# Patient Record
Sex: MALE | Race: WHITE | NOT HISPANIC OR LATINO | Employment: UNEMPLOYED | URBAN - METROPOLITAN AREA
[De-identification: names, ages, dates, MRNs, and addresses within clinical notes are randomized per-mention and may not be internally consistent; named-entity substitution may affect disease eponyms.]

---

## 2020-12-26 NOTE — NUR
PATIENT RESTING COMFORTABLY IN HOSPITAL BED IN A SAFETY ROOM WITH A SITTER 
OUTSIDE DOOR WITHIN VIEW. SAFETY DINNER TRAY PROVIDED. NO ADDITIONAL NEEDS AT 
THIS TIME.

## 2020-12-26 NOTE — NUR
PT FONDLING HIS GENITALIA IN PLAIN VIEW OF DOORWAY. VERBALLY REDIRECTED AND 
REMINDED TO KEEP HIS PRIVATE AREAS PRIVATE. PT VERBALLY AGREES AND APOLOGIZES 
AT THIS TIME. DENIES ANY FURTHER NEEDS OR CONCERNS. SITTER CONTINUES IN DIRECT 
LINE OF SIGHT.

## 2020-12-26 NOTE — NUR
PATIENTS BELONGINGS IN ONE BAG. ALTHOUGH HE STATES HE IS "TIRED AND WANTS TO 
KILL HIMSELF ANYWAY HE CAN" HE DOES NOT HAVE ACCESS TO ANY WEAPONS INCLUDING A 
GUN. MEAL TRAY ORDERED. SITTER OUTSIDE ROOM WITHIN VIEW.

## 2020-12-26 NOTE — NUR
PATIENT BIB [facility] AFTER COMPLAINING OF 9/10 CP/ NITRO  ASPIRIN IN ROUTE. 
PAIN STILL 9/10. WHILE WAITING FOR ROOM STARTED STATING HE WANTED TO KILL 
HIMSELF. PATIENT IS ACTIVELY HALLUCINATING, HEARING VOICES TELLING HIM TO KILL 
HIMSELF. HE STATES "HE CAN'T WAIT TO GET OUT OF HERE SO HE CAN GO KILL 
HIMSELF." WHEN ASKED IF HE HAD A PLAN HE STATED HE WOULD JUMP IN FRONT OF 
TRAFFIC, SLIT HIS THROAT, SLIT HIS WRISTS, OR SHOOT HIMSELF. PATIENT IS IN A 
SECURE ROOM WITH A SITTER OUTSIDE DOOR WITHIN VIEW. WARM BLANKETS PROVIDED AND 
ALL BELONGINGS REMOVED AND LOCKED UP. PATIENT NORMALLY TAKES SEROQUEL AND 
PROZAC BUT HAS BEEN OFF THEM FOR ABOUT A MONTH.

## 2020-12-26 NOTE — NUR
PT RESTING IN BED, EYES CLOSED, RESPIRATIONS EVEN AND UNLABORED. SITTER 
CONTINUES IN DIRECT LINE OF SIGHT.

## 2020-12-26 NOTE — NUR
PT IN BED, LIGHTS DIMMED. PT DENIES ANY CURRENT NEEDS OR CONCERNS, SITTER 
CONTINUES IN DIRECT LINE OF SIGHT.

## 2020-12-27 NOTE — NUR
Patient resting in hospital bed with no complaints. Respirations even and 
unlabored. Room secured, belongings locked in cabinet. Sitter outside.

## 2020-12-27 NOTE — NUR
All items from tray returned and disposed of. Pt ate 100% of meal tray. He 
states the voices "are getting worse." No PRN meds currently ordered. Will 
speak with MD regarding possible PRN medications.

## 2020-12-27 NOTE — NUR
Pt ate 100% of breakfast and tray removed/cleaned up room. Pt resting with 
lights dimmed without further c/o voiced at this time.

## 2020-12-27 NOTE — NUR
PT RESTING, EYES CLOSED, RESPIRATIONS EVEN AND UNLABORED. NAD NOTED. SITTER IN 
DIRECT LINE OF SIGHT.

## 2020-12-27 NOTE — NUR
Report received and care assumed. Pt in room lying on left side and awakens as 
soon as I enter. He asked if anyone was outside to get him and reassurances 
were made they were not and that he is in a safe place. He states he wants to 
kill himself, denies need for food, and is cold. Warm blanket provided and room 
cleaned of safety tray and empty milk carton. No other items beside bed and 
linens in room with pt and PSA and RN posted outside of room to continue to 
ensure pt safety from himself and falls.

## 2020-12-27 NOTE — NUR
Pt resting in room with head covered by blanket at this time, NAD noted and no 
changes from initial assessment noted.

## 2020-12-27 NOTE — NUR
Pt awake and provided crackers and juice for a snack as he slept through lunch. 
VS reassessed and found to be within baseline from shift earlier.

## 2020-12-27 NOTE — NUR
Report received from ENIO Cedeno. This RN to assume care. Patient sleeping in 
hospital bed. Respirations even and unlabored. Room secured, belongings locked 
in cabinet. Sitter outside.

## 2020-12-27 NOTE — NUR
PT AWAKE IN BED, BUT SILENT. DENIES ANY NEEDS OR CONCERNS AT THIS TIME. SITTER 
CONTINUES IN DIRECT LINE OF SIGHT.

## 2020-12-27 NOTE — NUR
Pt provided 2 apple juice cups at this time and updated to plan of care for PRN 
Ativan should he feel like he is going to escalate. Pt states understanding and 
agreement made to notify RN prior to escalation.

## 2020-12-27 NOTE — NUR
Pt up to bathroom with PSA yelling that he wants to kill himself. Walked back 
to room without further issue or escalation and provided breakfast tray.

## 2020-12-28 NOTE — NUR
report from james pt in nad asked for water water given pt in nad resting 
in bed with sitter at doorway for observation

## 2020-12-28 NOTE — NUR
Break RN: Patient resting in hospital bed with no complaints. Respirations even 
and unlabored. Room secured, belongings locked in cabinet. Sitter outside.

## 2020-12-28 NOTE — NUR
PT RESTING IN BED WITH SITTER AT DOOR. PT IN SI SERCURED ROOM. PT HAS EQUAL AND 
UNLABORED BREATHING.

## 2020-12-28 NOTE — NUR
PT LEFT ROOM AND TOOK SHORT WALK IN HALLS.  PT EASILY CORALLED BACK TO ROOM.  
PT GIVEN DINNER TRAY.  PT ATE MEAL.  IS CURRENTLY RESTING IN BED. WILL CONTINUE 
TO MONITOR.

## 2020-12-28 NOTE — NUR
PT RESTING CALMLY IN BED AT THIS TIME.  PT DID EAT MEAL TRAY.  NO STATED NEEDS 
FROM PT AT THIS TIME.  WILL CONTINUE TO MONITOR.

## 2020-12-28 NOTE — NUR
BREAK RN: PT SLEEPING ON HOSPITAL BED W/ GARAGE DOORS DOWN AND SITTER OUTSIDE 
ROOM FOR SAFETY. RESP EVEN AND UNLABORED, ABARHAM.

## 2020-12-28 NOTE — NUR
PT RESTING IN BED.  PT GIVEN CRACKERS AND SODA PER HIS REQUEST.  NO OTHER NEEDS 
AT THIS TIME.  SITTER AT DOOR FOR OBS.

## 2020-12-28 NOTE — NUR
PT LEFT ROOM AND WALKED AROUND UNIT IN A DETERMINED MANNER.  PT NOT LISTENING 
TO VERBAL DIRECTION.  SECURITY WAS CALLED TO ASSIST PT BACK TO ROOM.  PT 
BREATHING HEAVY AND FAST, HAS PULLED COVERS OVER FACE. WHEN ASKED PT WHY HE 
LEFT ROOM, PT STATED THERE ARE PEOPLE AFTER HIM.  ERP MADE AWARE OF PT CURRENT 
CONDITION.  ORDERS PLACED.  PT COOPERATIVE WITH QUESTIONS AND VITALS.  PT TOOK 
ORDERED MEDS.  SITTER AT DOOR.  WILL CONTINUE TO MONITOR.

## 2020-12-29 NOTE — NUR
[facility] THE 
MORNING. 12/30

## 2020-12-29 NOTE — NUR
PT CONTINUES TO SLEEP WITH NO COMPLAINTS, CHEST RISE AND FALL OBSERVED, SITTER 
AT BEDSIDE, ROOM SECURE

## 2020-12-29 NOTE — NUR
pt resting on hospital bed, nad, appears comfortable, denies additional 
questions or needs at this time, even and unlabored respirations, si 
precautions in place, sitter in line of sight, wctm.

## 2020-12-29 NOTE — NUR
PT SITTING ON HOSPITAL BED WITH EYES OPEN, ALERT TO ENVIRONMENT. PT'S BREAKFAST 
DELIVERED. PT STATES "MY HALLUCINATIONS ARE GETTING WORSE".

## 2020-12-29 NOTE — NUR
PT RESTING ON HOSPITAL BED, NAD, NO CHANGE IN CONDITION AT THIS TIME, EYES 
CLOSED, EVEN AND UNLABORED RESPIRATIONS. SITTER IN LINE OF SIGHT, SI 
PRECAUTIONS IN PLACE, WCTM. L2K

## 2020-12-29 NOTE — NUR
PT PHYSICAL AND SUICDIDE REASSESSMENT. VSS. PT STATES HE STILL HAS SUICIDAL 
THOUGHTS AND HALLUCINATIONS. HIS PLAN WOULD BE TO CUT HIS THROAT. PT MEDICATED 
PER MAR.

## 2020-12-29 NOTE — NUR
PT'S DINNER TRAY DELIVERED. PT RESTING ON [facility], EYES CLOSED. WOKE TO 
RN IN ROOM. NAD. ALL NEEDS MET AT THIS TIME.

## 2020-12-29 NOTE — NUR
BREAK RN: PT. RESTING ON BED WITH EYES CLOSED.  NO DISTRESS VISIBLE.  
RESPIRATIONS EVEN, NON-LABORED.  PT. IN SECURED ROOM WITH SITTER IN [place]ILA.

## 2020-12-29 NOTE — NUR
BREAK RN: PT CALMLY LAYING ON BED WITH EYES CLOSED, NAD WITH EQUAL CHEST 
RISE/FALL, NO NEEDS AT THIS TIME, PT REMAINS IN SAFE ENVIRONMENT, SITTER IN 
VIEW.

## 2020-12-30 NOTE — NUR
BREAK RN: PT SLEEPING ON GURNEY W/ GARAGE DOORS DOWN AND SITTER OUTSIDE ROOM 
FOR SAFETY. RESP EVEN AND UNLABORED, ABRAHAM.

## 2020-12-30 NOTE — NUR
THROUGHPUT NURSE: CALLED RASHAAD AT [facility], THE CHARGE NURSE, TO INQUIRE ABOUT 
ACCEPTING PT AND RASHAAD SAID HE WOULD CALL BACK

## 2020-12-30 NOTE — NUR
THROUGHPUT: PER RASHAAD ([facility]) "PT IS 2ND OR 3RD IN LINE FOR A BED SO IT'LL BE 
A FEW DAYS BEFORE WE CAN TAKE HIM", PSYCH APRN ([place]) AWARE & WILL FOLLOW-UP 
TOMORROW ON POC WITH SW TO LOOK INTO POSSIBLE BHU PLACEMENT SOONER.

## 2020-12-30 NOTE — NUR
pt medicated per emar, provided juice per request. pt denies any other needs, 
in line of sight of sitter

## 2020-12-31 NOTE — NUR
attempted to deliver meal tray, pt declines at this time. pt states that he 
wants to sleep more but that he will eat it later.  no resp distress



pt reports that he is still suicidal.  reports that he has a plan of "cutting 
his throat"  because "it's fast"



pt admits to visual and auditory hallucinations.  resting in position of 
comfort on hospital bed.  



room secure.  sitter present for safety

## 2020-12-31 NOTE — NUR
ALL PT BELONGINGS TRANSFERRED TO [facility] STAFF.  PT AMBULATORY W/ STEADY GAIT.  
PT DISCHARGED TO [facility] STAFF CARE.

## 2020-12-31 NOTE — NUR
PT GIVEN NEW LINENS, AMBULATED STEADY TO RESTROOM. REFUSES SHOWER AT THIS TIME 
AND STATES HE WILL TAKE ONE IN THE MORNING

## 2020-12-31 NOTE — NUR
assumed care of pt.  report from Katlin STEEN



pt here on legal hold for SI and hx of schizophrenia.  per report, pt has been 
having visual and auditory hallucinations and has a plan foor ending his life.  




pt is currently sleeping in position of comfort in no apparent distress.  room 
secure.  sitter at beside



pt is currenty awaiting placement at [facility]

## 2021-02-10 ENCOUNTER — APPOINTMENT (OUTPATIENT)
Dept: RADIOLOGY | Facility: MEDICAL CENTER | Age: 61
End: 2021-02-10
Attending: EMERGENCY MEDICINE
Payer: MEDICAID

## 2021-02-10 ENCOUNTER — HOSPITAL ENCOUNTER (EMERGENCY)
Dept: HOSPITAL 8 - ED | Age: 61
Discharge: HOME | End: 2021-02-10
Payer: SELF-PAY

## 2021-02-10 ENCOUNTER — HOSPITAL ENCOUNTER (OUTPATIENT)
Facility: MEDICAL CENTER | Age: 61
End: 2021-02-10
Attending: EMERGENCY MEDICINE | Admitting: INTERNAL MEDICINE
Payer: MEDICAID

## 2021-02-10 ENCOUNTER — PATIENT OUTREACH (OUTPATIENT)
Dept: HEALTH INFORMATION MANAGEMENT | Facility: OTHER | Age: 61
End: 2021-02-10

## 2021-02-10 ENCOUNTER — APPOINTMENT (OUTPATIENT)
Dept: CARDIOLOGY | Facility: MEDICAL CENTER | Age: 61
End: 2021-02-10
Attending: INTERNAL MEDICINE
Payer: MEDICAID

## 2021-02-10 ENCOUNTER — PHARMACY VISIT (OUTPATIENT)
Dept: PHARMACY | Facility: MEDICAL CENTER | Age: 61
End: 2021-02-10
Payer: COMMERCIAL

## 2021-02-10 VITALS — WEIGHT: 174.17 LBS | HEIGHT: 68 IN | BODY MASS INDEX: 26.4 KG/M2

## 2021-02-10 VITALS
RESPIRATION RATE: 17 BRPM | BODY MASS INDEX: 26.4 KG/M2 | TEMPERATURE: 96.9 F | DIASTOLIC BLOOD PRESSURE: 74 MMHG | SYSTOLIC BLOOD PRESSURE: 135 MMHG | OXYGEN SATURATION: 96 % | WEIGHT: 174.16 LBS | HEART RATE: 91 BPM | HEIGHT: 68 IN

## 2021-02-10 VITALS — DIASTOLIC BLOOD PRESSURE: 41 MMHG | SYSTOLIC BLOOD PRESSURE: 96 MMHG

## 2021-02-10 DIAGNOSIS — R07.9 ACUTE CHEST PAIN: ICD-10-CM

## 2021-02-10 DIAGNOSIS — I20.0 UNSTABLE ANGINA (HCC): ICD-10-CM

## 2021-02-10 DIAGNOSIS — R07.89: ICD-10-CM

## 2021-02-10 DIAGNOSIS — R07.2: Primary | ICD-10-CM

## 2021-02-10 DIAGNOSIS — E11.9: ICD-10-CM

## 2021-02-10 DIAGNOSIS — F41.9: ICD-10-CM

## 2021-02-10 DIAGNOSIS — Z79.01: ICD-10-CM

## 2021-02-10 DIAGNOSIS — I26.99: ICD-10-CM

## 2021-02-10 DIAGNOSIS — R06.02: ICD-10-CM

## 2021-02-10 DIAGNOSIS — R79.89 ELEVATED TROPONIN: ICD-10-CM

## 2021-02-10 DIAGNOSIS — I26.93 SINGLE SUBSEGMENTAL PULMONARY EMBOLISM WITHOUT ACUTE COR PULMONALE (HCC): ICD-10-CM

## 2021-02-10 PROBLEM — D72.829 LEUKOCYTOSIS: Status: ACTIVE | Noted: 2021-02-10

## 2021-02-10 PROBLEM — F19.10 POLYSUBSTANCE ABUSE (HCC): Status: ACTIVE | Noted: 2021-02-10

## 2021-02-10 PROBLEM — F17.200 TOBACCO DEPENDENCE: Status: ACTIVE | Noted: 2021-02-10

## 2021-02-10 LAB
ALBUMIN SERPL BCP-MCNC: 4.2 G/DL (ref 3.2–4.9)
ALBUMIN SERPL-MCNC: 3.8 G/DL (ref 3.4–5)
ALBUMIN/GLOB SERPL: 1.4 G/DL
ALP SERPL-CCNC: 126 U/L (ref 30–99)
ALT SERPL-CCNC: 11 U/L (ref 2–50)
AMPHET UR QL SCN: POSITIVE
ANION GAP SERPL CALC-SCNC: 17 MMOL/L (ref 7–16)
ANION GAP SERPL CALC-SCNC: 9 MMOL/L (ref 5–15)
AST SERPL-CCNC: 30 U/L (ref 12–45)
BARBITURATES UR QL SCN: NEGATIVE
BASOPHILS # BLD AUTO: 0.1 X10^3/UL (ref 0–0.1)
BASOPHILS # BLD AUTO: 0.3 % (ref 0–1.8)
BASOPHILS # BLD: 0.05 K/UL (ref 0–0.12)
BASOPHILS NFR BLD AUTO: 1 % (ref 0–1)
BENZODIAZ UR QL SCN: NEGATIVE
BILIRUB SERPL-MCNC: 0.8 MG/DL (ref 0.1–1.5)
BUN SERPL-MCNC: 22 MG/DL (ref 8–22)
BZE UR QL SCN: NEGATIVE
CALCIUM SERPL-MCNC: 9.3 MG/DL (ref 8.5–10.1)
CALCIUM SERPL-MCNC: 9.5 MG/DL (ref 8.5–10.5)
CANNABINOIDS UR QL SCN: NEGATIVE
CHLORIDE SERPL-SCNC: 101 MMOL/L (ref 96–112)
CHLORIDE SERPL-SCNC: 109 MMOL/L (ref 98–107)
CK SERPL-CCNC: 610 U/L (ref 0–154)
CO2 SERPL-SCNC: 23 MMOL/L (ref 20–33)
CREAT SERPL-MCNC: 1.01 MG/DL (ref 0.5–1.4)
CREAT SERPL-MCNC: 1.25 MG/DL (ref 0.7–1.3)
D DIMER PPP IA.FEU-MCNC: 0.71 UG/ML (FEU) (ref 0–0.5)
EKG IMPRESSION: NORMAL
EKG IMPRESSION: NORMAL
EOSINOPHIL # BLD AUTO: 0 K/UL (ref 0–0.51)
EOSINOPHIL # BLD AUTO: 0 X10^3/UL (ref 0–0.4)
EOSINOPHIL NFR BLD AUTO: 0 % (ref 1–7)
EOSINOPHIL NFR BLD: 0 % (ref 0–6.9)
ERYTHROCYTE [DISTWIDTH] IN BLOOD BY AUTOMATED COUNT: 14.1 % (ref 9.4–14.8)
ERYTHROCYTE [DISTWIDTH] IN BLOOD BY AUTOMATED COUNT: 47.6 FL (ref 35.9–50)
GLOBULIN SER CALC-MCNC: 3 G/DL (ref 1.9–3.5)
GLUCOSE SERPL-MCNC: 111 MG/DL (ref 65–99)
HCT VFR BLD AUTO: 43.4 % (ref 42–52)
HGB BLD-MCNC: 14.3 G/DL (ref 14–18)
IMM GRANULOCYTES # BLD AUTO: 0.1 K/UL (ref 0–0.11)
IMM GRANULOCYTES NFR BLD AUTO: 0.6 % (ref 0–0.9)
INR PPP: 1.32 (ref 0.93–1.1)
LIPASE SERPL-CCNC: 11 U/L (ref 11–82)
LV EJECT FRACT  99904: 65
LV EJECT FRACT MOD 2C 99903: 59.85
LV EJECT FRACT MOD 4C 99902: 65.59
LV EJECT FRACT MOD BP 99901: 63.36
LYMPHOCYTES # BLD AUTO: 1.4 X10^3/UL (ref 1–3.4)
LYMPHOCYTES # BLD AUTO: 1.77 K/UL (ref 1–4.8)
LYMPHOCYTES NFR BLD AUTO: 10 % (ref 22–44)
LYMPHOCYTES NFR BLD: 10.4 % (ref 22–41)
MCH RBC QN AUTO: 31.2 PG (ref 27–33)
MCH RBC QN AUTO: 31.3 PG (ref 27.5–34.5)
MCHC RBC AUTO-ENTMCNC: 32.9 G/DL (ref 33.7–35.3)
MCHC RBC AUTO-ENTMCNC: 34.1 G/DL (ref 33.2–36.2)
MCV RBC AUTO: 94.8 FL (ref 81.4–97.8)
MD: NO
METHADONE UR QL SCN: NEGATIVE
MONOCYTES # BLD AUTO: 0.9 X10^3/UL (ref 0.2–0.8)
MONOCYTES # BLD AUTO: 1.11 K/UL (ref 0–0.85)
MONOCYTES NFR BLD AUTO: 6.5 % (ref 0–13.4)
MONOCYTES NFR BLD AUTO: 7 % (ref 2–9)
NEUTROPHILS # BLD AUTO: 11.1 X10^3/UL (ref 1.8–6.8)
NEUTROPHILS # BLD AUTO: 13.97 K/UL (ref 1.82–7.42)
NEUTROPHILS NFR BLD AUTO: 82 % (ref 42–75)
NEUTROPHILS NFR BLD: 82.2 % (ref 44–72)
NRBC # BLD AUTO: 0 K/UL
NRBC BLD-RTO: 0 /100 WBC
OPIATES UR QL SCN: POSITIVE
OXYCODONE UR QL SCN: NEGATIVE
PCP UR QL SCN: NEGATIVE
PLATELET # BLD AUTO: 325 K/UL (ref 164–446)
PLATELET # BLD AUTO: 325 X10^3/UL (ref 130–400)
PMV BLD AUTO: 7.8 FL (ref 7.4–10.4)
PMV BLD AUTO: 9.6 FL (ref 9–12.9)
POTASSIUM SERPL-SCNC: 4.3 MMOL/L (ref 3.6–5.5)
PROPOXYPH UR QL SCN: NEGATIVE
PROT SERPL-MCNC: 7.2 G/DL (ref 6–8.2)
PROTHROMBIN TIME: 14 SECONDS (ref 9.6–11.5)
RBC # BLD AUTO: 4.56 X10^6/UL (ref 4.38–5.82)
RBC # BLD AUTO: 4.58 M/UL (ref 4.7–6.1)
SARS-COV-2 RNA RESP QL NAA+PROBE: NOTDETECTED
SODIUM SERPL-SCNC: 141 MMOL/L (ref 135–145)
SPECIMEN SOURCE: NORMAL
TROPONIN I SERPL-MCNC: 0.03 NG/ML (ref 0–0.04)
TROPONIN T SERPL-MCNC: 23 NG/L (ref 6–19)
TROPONIN T SERPL-MCNC: 23 NG/L (ref 6–19)
TROPONIN T SERPL-MCNC: 27 NG/L (ref 6–19)
TSH SERPL DL<=0.005 MIU/L-ACNC: 0.94 UIU/ML (ref 0.38–5.33)
WBC # BLD AUTO: 17 K/UL (ref 4.8–10.8)

## 2021-02-10 PROCEDURE — 71045 X-RAY EXAM CHEST 1 VIEW: CPT

## 2021-02-10 PROCEDURE — 700117 HCHG RX CONTRAST REV CODE 255: Performed by: EMERGENCY MEDICINE

## 2021-02-10 PROCEDURE — 84484 ASSAY OF TROPONIN QUANT: CPT | Mod: 91

## 2021-02-10 PROCEDURE — G0378 HOSPITAL OBSERVATION PER HR: HCPCS

## 2021-02-10 PROCEDURE — 82040 ASSAY OF SERUM ALBUMIN: CPT

## 2021-02-10 PROCEDURE — 80048 BASIC METABOLIC PNL TOTAL CA: CPT

## 2021-02-10 PROCEDURE — A9270 NON-COVERED ITEM OR SERVICE: HCPCS | Performed by: INTERNAL MEDICINE

## 2021-02-10 PROCEDURE — 93005 ELECTROCARDIOGRAM TRACING: CPT

## 2021-02-10 PROCEDURE — 700111 HCHG RX REV CODE 636 W/ 250 OVERRIDE (IP): Performed by: EMERGENCY MEDICINE

## 2021-02-10 PROCEDURE — U0005 INFEC AGEN DETEC AMPLI PROBE: HCPCS

## 2021-02-10 PROCEDURE — 80053 COMPREHEN METABOLIC PANEL: CPT

## 2021-02-10 PROCEDURE — 700111 HCHG RX REV CODE 636 W/ 250 OVERRIDE (IP): Performed by: INTERNAL MEDICINE

## 2021-02-10 PROCEDURE — 85379 FIBRIN DEGRADATION QUANT: CPT

## 2021-02-10 PROCEDURE — 93306 TTE W/DOPPLER COMPLETE: CPT

## 2021-02-10 PROCEDURE — 93005 ELECTROCARDIOGRAM TRACING: CPT | Performed by: INTERNAL MEDICINE

## 2021-02-10 PROCEDURE — 85025 COMPLETE CBC W/AUTO DIFF WBC: CPT

## 2021-02-10 PROCEDURE — U0003 INFECTIOUS AGENT DETECTION BY NUCLEIC ACID (DNA OR RNA); SEVERE ACUTE RESPIRATORY SYNDROME CORONAVIRUS 2 (SARS-COV-2) (CORONAVIRUS DISEASE [COVID-19]), AMPLIFIED PROBE TECHNIQUE, MAKING USE OF HIGH THROUGHPUT TECHNOLOGIES AS DESCRIBED BY CMS-2020-01-R: HCPCS

## 2021-02-10 PROCEDURE — 96374 THER/PROPH/DIAG INJ IV PUSH: CPT | Mod: XU

## 2021-02-10 PROCEDURE — 99218 PR INITIAL OBSERVATION CARE,LEVL I: CPT | Performed by: INTERNAL MEDICINE

## 2021-02-10 PROCEDURE — A9270 NON-COVERED ITEM OR SERVICE: HCPCS | Performed by: EMERGENCY MEDICINE

## 2021-02-10 PROCEDURE — 83735 ASSAY OF MAGNESIUM: CPT

## 2021-02-10 PROCEDURE — 93306 TTE W/DOPPLER COMPLETE: CPT | Mod: 26 | Performed by: INTERNAL MEDICINE

## 2021-02-10 PROCEDURE — 82550 ASSAY OF CK (CPK): CPT

## 2021-02-10 PROCEDURE — 700102 HCHG RX REV CODE 250 W/ 637 OVERRIDE(OP): Performed by: INTERNAL MEDICINE

## 2021-02-10 PROCEDURE — 96376 TX/PRO/DX INJ SAME DRUG ADON: CPT | Mod: XU

## 2021-02-10 PROCEDURE — 83690 ASSAY OF LIPASE: CPT

## 2021-02-10 PROCEDURE — 99285 EMERGENCY DEPT VISIT HI MDM: CPT

## 2021-02-10 PROCEDURE — 93005 ELECTROCARDIOGRAM TRACING: CPT | Performed by: EMERGENCY MEDICINE

## 2021-02-10 PROCEDURE — 85610 PROTHROMBIN TIME: CPT

## 2021-02-10 PROCEDURE — 84484 ASSAY OF TROPONIN QUANT: CPT

## 2021-02-10 PROCEDURE — 71275 CT ANGIOGRAPHY CHEST: CPT

## 2021-02-10 PROCEDURE — 80307 DRUG TEST PRSMV CHEM ANLYZR: CPT

## 2021-02-10 PROCEDURE — RXMED WILLOW AMBULATORY MEDICATION CHARGE: Performed by: HOSPITALIST

## 2021-02-10 PROCEDURE — 36415 COLL VENOUS BLD VENIPUNCTURE: CPT

## 2021-02-10 PROCEDURE — 96372 THER/PROPH/DIAG INJ SC/IM: CPT | Mod: XU

## 2021-02-10 PROCEDURE — 700102 HCHG RX REV CODE 250 W/ 637 OVERRIDE(OP): Performed by: EMERGENCY MEDICINE

## 2021-02-10 PROCEDURE — 84443 ASSAY THYROID STIM HORMONE: CPT

## 2021-02-10 RX ORDER — ASPIRIN 81 MG/1
324 TABLET, CHEWABLE ORAL ONCE
Status: COMPLETED | OUTPATIENT
Start: 2021-02-10 | End: 2021-02-10

## 2021-02-10 RX ORDER — ATORVASTATIN CALCIUM 80 MG/1
80 TABLET, FILM COATED ORAL EVERY EVENING
Qty: 30 TABLET | Refills: 0 | Status: SHIPPED | OUTPATIENT
Start: 2021-02-10

## 2021-02-10 RX ORDER — BISACODYL 10 MG
10 SUPPOSITORY, RECTAL RECTAL
Status: DISCONTINUED | OUTPATIENT
Start: 2021-02-10 | End: 2021-02-10 | Stop reason: HOSPADM

## 2021-02-10 RX ORDER — ASPIRIN 300 MG/1
300 SUPPOSITORY RECTAL ONCE
Status: COMPLETED | OUTPATIENT
Start: 2021-02-10 | End: 2021-02-10

## 2021-02-10 RX ORDER — POLYETHYLENE GLYCOL 3350 17 G/17G
1 POWDER, FOR SOLUTION ORAL
Status: DISCONTINUED | OUTPATIENT
Start: 2021-02-10 | End: 2021-02-10 | Stop reason: HOSPADM

## 2021-02-10 RX ORDER — ASPIRIN 81 MG/1
81 TABLET ORAL DAILY
Qty: 30 TABLET | Refills: 0 | Status: SHIPPED | OUTPATIENT
Start: 2021-02-11

## 2021-02-10 RX ORDER — ACETAMINOPHEN 325 MG/1
650 TABLET ORAL EVERY 6 HOURS PRN
Status: DISCONTINUED | OUTPATIENT
Start: 2021-02-10 | End: 2021-02-10 | Stop reason: HOSPADM

## 2021-02-10 RX ORDER — LABETALOL HYDROCHLORIDE 5 MG/ML
10 INJECTION, SOLUTION INTRAVENOUS EVERY 4 HOURS PRN
Status: DISCONTINUED | OUTPATIENT
Start: 2021-02-10 | End: 2021-02-10 | Stop reason: HOSPADM

## 2021-02-10 RX ORDER — NITROGLYCERIN 0.4 MG/1
0.4 TABLET SUBLINGUAL
Status: COMPLETED | OUTPATIENT
Start: 2021-02-10 | End: 2021-02-10

## 2021-02-10 RX ORDER — ATORVASTATIN CALCIUM 80 MG/1
80 TABLET, FILM COATED ORAL EVERY EVENING
Status: DISCONTINUED | OUTPATIENT
Start: 2021-02-10 | End: 2021-02-10 | Stop reason: HOSPADM

## 2021-02-10 RX ORDER — MORPHINE SULFATE 4 MG/ML
4 INJECTION, SOLUTION INTRAMUSCULAR; INTRAVENOUS
Status: DISCONTINUED | OUTPATIENT
Start: 2021-02-10 | End: 2021-02-10 | Stop reason: HOSPADM

## 2021-02-10 RX ORDER — MORPHINE SULFATE 4 MG/ML
4 INJECTION, SOLUTION INTRAMUSCULAR; INTRAVENOUS ONCE
Status: COMPLETED | OUTPATIENT
Start: 2021-02-10 | End: 2021-02-10

## 2021-02-10 RX ORDER — NICOTINE 21 MG/24HR
21 PATCH, TRANSDERMAL 24 HOURS TRANSDERMAL
Status: DISCONTINUED | OUTPATIENT
Start: 2021-02-10 | End: 2021-02-10 | Stop reason: HOSPADM

## 2021-02-10 RX ORDER — ENALAPRIL MALEATE 2.5 MG/1
2.5 TABLET ORAL TWICE DAILY
Status: DISCONTINUED | OUTPATIENT
Start: 2021-02-10 | End: 2021-02-10 | Stop reason: HOSPADM

## 2021-02-10 RX ORDER — AMOXICILLIN 250 MG
2 CAPSULE ORAL 2 TIMES DAILY
Status: DISCONTINUED | OUTPATIENT
Start: 2021-02-10 | End: 2021-02-10 | Stop reason: HOSPADM

## 2021-02-10 RX ADMIN — ENOXAPARIN SODIUM 80 MG: 80 INJECTION SUBCUTANEOUS at 06:34

## 2021-02-10 RX ADMIN — MORPHINE SULFATE 4 MG: 4 INJECTION INTRAVENOUS at 04:24

## 2021-02-10 RX ADMIN — MORPHINE SULFATE 4 MG: 4 INJECTION INTRAVENOUS at 05:36

## 2021-02-10 RX ADMIN — IOHEXOL 40 ML: 350 INJECTION, SOLUTION INTRAVENOUS at 05:30

## 2021-02-10 RX ADMIN — NITROGLYCERIN 0.4 MG: 0.4 TABLET, ORALLY DISINTEGRATING SUBLINGUAL at 04:54

## 2021-02-10 RX ADMIN — ASPIRIN 324 MG: 81 TABLET, CHEWABLE ORAL at 03:56

## 2021-02-10 RX ADMIN — ENALAPRIL MALEATE 2.5 MG: 2.5 TABLET ORAL at 09:38

## 2021-02-10 RX ADMIN — METOPROLOL TARTRATE 25 MG: 25 TABLET, FILM COATED ORAL at 06:34

## 2021-02-10 RX ADMIN — NICOTINE TRANSDERMAL SYSTEM 21 MG: 21 PATCH, EXTENDED RELEASE TRANSDERMAL at 06:35

## 2021-02-10 RX ADMIN — NITROGLYCERIN 0.4 MG: 0.4 TABLET, ORALLY DISINTEGRATING SUBLINGUAL at 04:23

## 2021-02-10 RX ADMIN — NITROGLYCERIN 0.4 MG: 0.4 TABLET, ORALLY DISINTEGRATING SUBLINGUAL at 04:34

## 2021-02-10 ASSESSMENT — ENCOUNTER SYMPTOMS
PALPITATIONS: 0
STRIDOR: 0
FEVER: 0
INSOMNIA: 0
BLURRED VISION: 0
NAUSEA: 0
MYALGIAS: 0
SORE THROAT: 0
NECK PAIN: 0
HEMOPTYSIS: 0
COUGH: 0
WEIGHT LOSS: 0
HEADACHES: 0
DOUBLE VISION: 0
BRUISES/BLEEDS EASILY: 0
VOMITING: 0
DIZZINESS: 0
DEPRESSION: 0

## 2021-02-10 ASSESSMENT — LIFESTYLE VARIABLES
TOTAL SCORE: 0
HAVE PEOPLE ANNOYED YOU BY CRITICIZING YOUR DRINKING: NO
EVER FELT BAD OR GUILTY ABOUT YOUR DRINKING: NO
EVER HAD A DRINK FIRST THING IN THE MORNING TO STEADY YOUR NERVES TO GET RID OF A HANGOVER: NO
ALCOHOL_USE: NO
HOW MANY TIMES IN THE PAST YEAR HAVE YOU HAD 5 OR MORE DRINKS IN A DAY: 0
CONSUMPTION TOTAL: NEGATIVE
ON A TYPICAL DAY WHEN YOU DRINK ALCOHOL HOW MANY DRINKS DO YOU HAVE: 0
HAVE YOU EVER FELT YOU SHOULD CUT DOWN ON YOUR DRINKING: NO
TOTAL SCORE: 0
AVERAGE NUMBER OF DAYS PER WEEK YOU HAVE A DRINK CONTAINING ALCOHOL: 0
TOTAL SCORE: 0

## 2021-02-10 ASSESSMENT — PATIENT HEALTH QUESTIONNAIRE - PHQ9
1. LITTLE INTEREST OR PLEASURE IN DOING THINGS: NOT AT ALL
SUM OF ALL RESPONSES TO PHQ9 QUESTIONS 1 AND 2: 0
2. FEELING DOWN, DEPRESSED, IRRITABLE, OR HOPELESS: NOT AT ALL

## 2021-02-10 ASSESSMENT — PAIN DESCRIPTION - DESCRIPTORS: DESCRIPTORS: CRAMPING

## 2021-02-10 ASSESSMENT — FIBROSIS 4 INDEX: FIB4 SCORE: 1.73

## 2021-02-10 NOTE — CARE PLAN
Problem: Knowledge Deficit  Goal: Knowledge of disease process/condition, treatment plan, diagnostic tests, and medications will improve  Note: ECHO labs and lovenox ordered     Problem: Respiratory:  Goal: Respiratory status will improve  Note: Monitor any breathing discomfort

## 2021-02-10 NOTE — ASSESSMENT & PLAN NOTE
Complicated by coronary artery disease, tobacco and polysubstance abuse  ACS care set deployed, follow-up troponin, TSH, lipid profile, CTA chest

## 2021-02-10 NOTE — DISCHARGE SUMMARY
Discharge Summary    CHIEF COMPLAINT ON ADMISSION  Chief Complaint   Patient presents with   • Chest Pain       Reason for Admission  EMS     Admission Date  2/10/2021    CODE STATUS  Full Code    HPI & HOSPITAL COURSE  This is a 60 y.o. male with past medical history of CAD, tobacco abuse, substance abuse here with chest pain.  He was found to have nonocclusive PE in the lingular subsegmental branch.  He has been started on anticoagulation and does not requiring any supplemental oxygen.  His chest pain has resolved.  His echocardiogram was within normal limits.      Therefore, he is discharged in good and stable condition to home with close outpatient follow-up.      Discharge Date  2/10/2021    FOLLOW UP ITEMS POST DISCHARGE  Follow-up with PCP for further refills of Xarelto    DISCHARGE DIAGNOSES  Active Problems:    Pulmonary embolism (HCC) POA: Unknown    Pain in the chest POA: Unknown    Leukocytosis POA: Unknown    Polysubstance abuse (HCC) POA: Unknown    Tobacco dependence POA: Unknown  Resolved Problems:    * No resolved hospital problems. *      FOLLOW UP  No future appointments.  [address]  [address]  [facility] [address]  [phone]    Please call or walk in to establish with a Primary Care Physician and schedule a hospital follow up. Thank you       MEDICATIONS ON DISCHARGE     Medication List      START taking these medications      Instructions   aspirin 81 MG EC tablet  Start taking on: February 11, 2021   Take 1 tablet by mouth every day.  Dose: 81 mg     atorvastatin 80 MG tablet  Commonly known as: LIPITOR   Take 1 tablet by mouth every evening.  Dose: 80 mg     metoprolol tartrate 25 MG Tabs  Commonly known as: LOPRESSOR   Take 1 tablet by mouth 2 Times a Day.  Dose: 25 mg     * rivaroxaban 15 MG Tabs tablet  Commonly known as: XARELTO   Take 1 tablet by mouth 2 times a day, with meals.  Dose: 15 mg     * rivaroxaban 20 MG Tabs tablet  Start taking on:  March 3, 2021  Commonly known as: XARELTO   Take 1 tablet by mouth with dinner.  Dose: 20 mg         * This list has 2 medication(s) that are the same as other medications prescribed for you. Read the directions carefully, and ask your doctor or other care provider to review them with you.                Allergies  Allergies   Allergen Reactions   • Fish Allergy Hives       DIET  Orders Placed This Encounter   Procedures   • Diet Order Diet: Regular     Standing Status:   Standing     Number of Occurrences:   1     Order Specific Question:   Diet:     Answer:   Regular [1]       ACTIVITY  As tolerated.  Weight bearing as tolerated    CONSULTATIONS  None    PROCEDURES  None    LABORATORY  Lab Results   Component Value Date    SODIUM 141 02/10/2021    POTASSIUM 4.3 02/10/2021    CHLORIDE 101 02/10/2021    CO2 23 02/10/2021    GLUCOSE 111 (H) 02/10/2021    BUN 22 02/10/2021    CREATININE 1.01 02/10/2021        Lab Results   Component Value Date    WBC 17.0 (H) 02/10/2021    HEMOGLOBIN 14.3 02/10/2021    HEMATOCRIT 43.4 02/10/2021    PLATELETCT 325 02/10/2021        Total time of the discharge process exceeds 33 minutes.

## 2021-02-10 NOTE — ED NOTES
Med rec complete per pt at bedside.  Allergies reviewed and updated  Pt states no ABX in the last 14 days.

## 2021-02-10 NOTE — ED NOTES
Pt aox4, skin pink warm and dry, airway patent, rr even and unlabored, nad noted. No new complaints. Pt vss. Pt transports to floor on cardiac monitor in stable condition with CARMENCITA Teixeira.

## 2021-02-10 NOTE — PROGRESS NOTES
Received from ED, aaox4, up setady , denies any chest apin and discomfort, POC discussed, NPO until cleared by MD, labs and ECHO ordered, needs attended.

## 2021-02-10 NOTE — H&P
"Hospital Medicine History & Physical Note    Date of Service  2/10/2021    Primary Care Physician  No primary care provider on file.    Consultants    Code Status  Full Code    Chief Complaint  Chief Complaint   Patient presents with   • Chest Pain       History of Presenting Illness  60 y.o. male who presented 2/10/2021 brought in by EMS for acute, constant mid sternal chest pain onset 1.5 hours ago. Patient describes having \"merle-horse\"-like pain to his chest that started at 2:30 AM. He reports having a history of heart attacks, but his current pain does not feel as severe. Patient states he has taken nitro in the past but he currently is out. Patient has a history of smoking tobacco, marijuana, and methamphetamine. He has mild nausea, but denies any associated leg swelling, shortness of breath, vomiting, or hemoptysis. He denies any history of blood clots but was ordered some kind of blood thinner a year ago after hospitalization in [place] which she has subsequently discontinued.    Review of Systems  Review of Systems   Constitutional: Negative for fever, malaise/fatigue and weight loss.   HENT: Negative for sore throat and tinnitus.    Eyes: Negative for blurred vision and double vision.   Respiratory: Negative for cough, hemoptysis and stridor.    Cardiovascular: Negative for chest pain and palpitations.   Gastrointestinal: Negative for nausea and vomiting.   Genitourinary: Negative for dysuria and urgency.   Musculoskeletal: Negative for myalgias and neck pain.   Skin: Negative for itching and rash.   Neurological: Negative for dizziness and headaches.   Endo/Heme/Allergies: Does not bruise/bleed easily.   Psychiatric/Behavioral: Negative for depression. The patient does not have insomnia.        Past Medical History   has no past medical history on file.    Surgical History   has no past surgical history on file.     Family History  family history is not on file.     Social History       Allergies  No " Known Allergies    Medications  None       Physical Exam  Temp:  [36.5 °C (97.7 °F)] 36.5 °C (97.7 °F)  Pulse:  [101-112] 110  Resp:  [18-20] 19  BP: ()/(46-92) 95/46  SpO2:  [90 %-96 %] 90 %    Physical Exam  Vitals and nursing note reviewed.   Constitutional:       General: He is not in acute distress.     Appearance: Normal appearance. He is normal weight. He is not toxic-appearing.      Comments: Severely disheveled   HENT:      Head: Normocephalic and atraumatic.      Nose: Nose normal. No congestion or rhinorrhea.      Mouth/Throat:      Mouth: Mucous membranes are moist.      Pharynx: Oropharynx is clear.   Eyes:      Extraocular Movements: Extraocular movements intact.      Conjunctiva/sclera: Conjunctivae normal.      Pupils: Pupils are equal, round, and reactive to light.   Neck:      Vascular: No carotid bruit.   Cardiovascular:      Rate and Rhythm: Normal rate and regular rhythm.      Pulses: Normal pulses.      Heart sounds: Normal heart sounds. No murmur. No gallop.    Pulmonary:      Effort: No respiratory distress.      Breath sounds: Normal breath sounds. No wheezing or rales.   Abdominal:      General: Abdomen is flat. Bowel sounds are normal. There is no distension.      Palpations: Abdomen is soft. There is no mass.      Tenderness: There is no abdominal tenderness.      Hernia: No hernia is present.   Musculoskeletal:         General: No tenderness or signs of injury.      Cervical back: Normal range of motion and neck supple. No muscular tenderness.   Lymphadenopathy:      Cervical: No cervical adenopathy.   Skin:     Capillary Refill: Capillary refill takes less than 2 seconds.      Coloration: Skin is not jaundiced or pale.      Findings: No bruising.   Neurological:      General: No focal deficit present.      Mental Status: He is alert and oriented to person, place, and time. Mental status is at baseline.      Cranial Nerves: No cranial nerve deficit.      Motor: No weakness.       Coordination: Coordination normal.   Psychiatric:         Mood and Affect: Mood normal.         Thought Content: Thought content normal.         Judgment: Judgment normal.         Laboratory:  Recent Labs     02/10/21  0413   WBC 17.0*   RBC 4.58*   HEMOGLOBIN 14.3   HEMATOCRIT 43.4   MCV 94.8   MCH 31.2   MCHC 32.9*   RDW 47.6   PLATELETCT 325   MPV 9.6     Recent Labs     02/10/21  0413   SODIUM 141   POTASSIUM 4.3   CHLORIDE 101   CO2 23   GLUCOSE 111*   BUN 22   CREATININE 1.01   CALCIUM 9.5     Recent Labs     02/10/21  0413   ALTSGPT 11   ASTSGOT 30   ALKPHOSPHAT 126*   TBILIRUBIN 0.8   LIPASE 11   GLUCOSE 111*         No results for input(s): NTPROBNP in the last 72 hours.      Recent Labs     02/10/21  0413   TROPONINT 23*       Imaging:  CT-CTA CHEST PULMONARY ARTERY W/ RECONS   Final Result         1.  Nonocclusive pulmonary embolism in the lingular subsegmental branch. No radiographic findings to indicate right ventricular strain.   2.  Atherosclerosis and atherosclerotic coronary artery disease.      These findings were discussed with the patient's clinician, Farrukh Vergara, on 2/10/2021 5:45 AM.      DX-CHEST-PORTABLE (1 VIEW)   Final Result         1.  No acute cardiopulmonary disease.      EC-ECHOCARDIOGRAM COMPLETE W/ CONT    (Results Pending)         Assessment/Plan:  I anticipate this patient will require at least two midnights for appropriate medical management, necessitating inpatient admission.    Pulmonary embolism (HCC)  Assessment & Plan  Hemodynamically stable, Lovenox, follow-up 2D echo and serial troponin    Tobacco dependence  Assessment & Plan  3 packs/day, 150 pack years.  Cessation counseling performed, request nicotine patch    Polysubstance abuse (HCC)  Assessment & Plan  Cessation counseling, discharge planning consult for rehab options    Leukocytosis  Assessment & Plan  Without fever, possible stress response,   follow-up Covid and CBC    Pain in the chest  Assessment &  Plan  Complicated by coronary artery disease, tobacco and polysubstance abuse  ACS care set deployed, follow-up troponin, TSH, lipid profile, CTA chest

## 2021-02-10 NOTE — ED PROVIDER NOTES
"Scribed for Farrukh Vergara M.D. by Jacob Sanchez. 2/10/2021  3:53 AM    Primary care provider: No primary care provider noted  Means of arrival: EMS  History obtained from: Patient  History limited by: None    CHIEF COMPLAINT  Chief Complaint   Patient presents with   • Chest Pain       HPI  Ravi Pink is a 60 y.o. male who presents to the Emergency Department brought in by EMS for acute, constant mid sternal chest pain onset 1.5 hours ago. Patient describes having \"merle-horse\"-like pain to his chest that started at 2:30 AM. He reports having a history of heart attacks, but his current pain does not feel as severe. Patient states he has taken nitro in the past but he currently is out. Patient has a history of smoking tobacco, marijuana, and methamphetamine. He has mild nausea, but denies any associated leg swelling, shortness of breath, vomiting, or hemoptysis. He denies any history of blood clots as he was \"put on a blood thinner in [place].\"     REVIEW OF SYSTEMS  Pertinent positives include: chest pain and nausea. Pertinent negatives include: leg swelling, shortness of breath, vomiting, or hemoptysis. See history of present illness. All other systems are negative.     PAST MEDICAL HISTORY       SURGICAL HISTORY  patient denies any surgical history    SOCIAL HISTORY  Social History     Tobacco Use   • Smoking status: Chronic smoker   Substance Use Topics   • Alcohol use: Not on file   • Drug use: Marijuana and Methamphetamines          FAMILY HISTORY  No family history noted     CURRENT MEDICATIONS  No current outpatient medications noted    ALLERGIES  Allergies   Allergen Reactions   • Fish Allergy Hives       PHYSICAL EXAM  VITAL SIGNS: BP (!) 169/92   Pulse (!) 107   Temp 36.5 °C (97.7 °F) (Temporal)   Resp 20   Ht 1.727 m (5' 8\")   Wt 83 kg (183 lb)   SpO2 96%   BMI 27.83 kg/m²     Constitutional: Alert in no apparent distress.  HENT: No signs of trauma, Bilateral external ears normal, Nose " normal. Uvula midline.   Eyes: Pupils are equal and reactive, Conjunctiva normal, Non-icteric.   Neck: Normal range of motion, No tenderness, Supple, No stridor.   Lymphatic: No lymphadenopathy noted.   Cardiovascular: Tachycardic, Regular rhythm, no murmurs.   Thorax & Lungs:  Normal breath sounds, No respiratory distress, No wheezing, No chest tenderness.   Abdomen:  Soft, No tenderness, No peritoneal signs, No masses, No pulsatile masses.   Skin: Warm, Dry, No erythema, No rash.   Back: No bony tenderness, No CVA tenderness.   Extremities: Intact distal pulses, No edema, No tenderness, No cyanosis.  Musculoskeletal: Good range of motion in all major joints. No tenderness to palpation or major deformities noted.   Neurologic: Alert , Normal motor function, Normal sensory function, No focal deficits noted.   Psychiatric: Affect normal, Judgment normal, Mood normal.       DIAGNOSTIC STUDIES / PROCEDURES    LABS  Labs Reviewed   CBC WITH DIFFERENTIAL - Abnormal; Notable for the following components:       Result Value    WBC 17.0 (*)     RBC 4.58 (*)     MCHC 32.9 (*)     Neutrophils-Polys 82.20 (*)     Lymphocytes 10.40 (*)     Neutrophils (Absolute) 13.97 (*)     Monos (Absolute) 1.11 (*)     All other components within normal limits   COMP METABOLIC PANEL - Abnormal; Notable for the following components:    Anion Gap 17.0 (*)     Glucose 111 (*)     Alkaline Phosphatase 126 (*)     All other components within normal limits   TROPONIN - Abnormal; Notable for the following components:    Troponin T 23 (*)     All other components within normal limits   TROPONIN - Abnormal; Notable for the following components:    Troponin T 23 (*)     All other components within normal limits   D-DIMER - Abnormal; Notable for the following components:    D-Dimer Screen 0.71 (*)     All other components within normal limits   URINE DRUG SCREEN - Abnormal; Notable for the following components:    Amphetamines Urine Positive (*)      Opiates Positive (*)     All other components within normal limits   LIPASE   ESTIMATED GFR   SARS-COV-2, PCR (IN-HOUSE)    Narrative:     Have you been in close contact with a person who is suspected  or known to be positive for COVID-19 within the last 30 days  (e.g. last seen that person < 30 days ago)->No   TSH   TROPONIN   CREATINE KINASE      All labs reviewed by me.    EKG  12 Lead EKG at 3:40AM interpreted by me to show:  Sinus tachycardia  Rate 104  Axis: Normal  Intervals: Normal  Normal T waves  ST depression in leads 2 and AVF  No obvious ST elevations.   My impression of this EKG: No STEMI.     12 Lead EKG at 4:45AM interpreted by me to show:  Normal sinus rhythm  Rate 99  Axis: Normal  Intervals: Normal  Normal T waves  Improving ST depression in leads 2 and AVF  No new ST elevations.   My impression of this EKG: No STEMI.       RADIOLOGY  CT-CTA CHEST PULMONARY ARTERY W/ RECONS   Final Result         1.  Nonocclusive pulmonary embolism in the lingular subsegmental branch. No radiographic findings to indicate right ventricular strain.   2.  Atherosclerosis and atherosclerotic coronary artery disease.      These findings were discussed with the patient's clinician, Farrukh Vergara, on 2/10/2021 5:45 AM.      DX-CHEST-PORTABLE (1 VIEW)   Final Result         1.  No acute cardiopulmonary disease.      EC-ECHOCARDIOGRAM COMPLETE W/ CONT    (Results Pending)     The radiologist's interpretation of all radiological studies have been reviewed by me.    COURSE & MEDICAL DECISION MAKING  Nursing notes, VS, PMSFHx reviewed in chart.    60 y.o. male p/w chief complaint of acute chest pain.    3:53 AM Patient seen and examined at bedside.      The differential diagnoses include but are not limited to:       #acute chest pain  CBC negative for significant anemia/leukocytosis.  BMP negative for significant electrolyte abnormality.  Troponin/EKG (interpreted by me) with ST depressions in inferior leads  HEART SCORE:  5  Initial troponin= 23  ddimer elevated, CTPE ordered, nonocclusive pulmonary embolism and lingular branch.  Started patient on Xarelto.    4:52 AM Chest pain still present after 2 tabs nitro and 4 morphine. Plan to treat with 1 more tab nitro and 4 more mg morphine and if pain does not improve will start on Nitro drip.     5:02 AM Age adjusted D-dimer is positive. CTA ordered. Plan for hospitalization.     5:18 AM I discussed the patient's case and the above findings with Dr. Pimentel (Hospitalist) who agrees to evaluate the patient for hospitalization.    5:47 AM Patient started on Xarelto for non-inclusive pulmonary embolism.     DISPOSITION:  Patient will be hospitalized by Dr. Pimentel in guarded condition.    FINAL IMPRESSION  1. Unstable angina (HCC)    2. Acute chest pain    3. Elevated troponin    4. Single subsegmental pulmonary embolism without acute cor pulmonale (HCC)          Jacob SALEEM (Scribe), am scribing for, and in the presence of, Farrukh Vergara M.D..    Electronically signed by: Jacob Sanchez (Scribe), 2/10/2021    IFarrukh M.D. personally performed the services described in this documentation, as scribed by Jacob Sanchez in my presence, and it is both accurate and complete.    The note accurately reflects work and decisions made by me.  Farrukh Vergara M.D.  2/10/2021  7:42 AM

## 2021-02-10 NOTE — NUR
PT WAS SEEN AT [facility] THIS AM AND HAS BLOOD CLOT IN LUNG AND WAS PRESCRIBED 
UNKNOWN BLOOD THINNER THAT HE DIDNT FILL. VSS. EKG DONE AT BEDSIDE. MED AT 
BEDSIDE FOR EVALUATION

## 2021-02-10 NOTE — NUR
BREAK RN: ATTEMPTED PIV X 2 WITH NO SUCCESS. BRIEF OPERATIVE NOTE Date of Procedure: 5/15/2019 Preoperative Diagnosis: LOWER ABDOMINAL PAIN, IRREGULAR MENSTRUATION, ENLARGED RIGHT OVARIAN CYST Postoperative Diagnosis: LOWER ABDOMINAL PAIN, IRREGULAR MENSTRUATION, ENLARGED RIGHT OVARIAN CYST Procedure(s): LAPAROSCOPIC RIGHT OVARIAN CYSTECTOMY,  
DILATATION AND CURETTAGE HYSTEROSCOPY POLYPECTOMY Surgeon(s) and Role: Patrick Bailey MD - Primary Surgical Assistant: Marilou Hagan Surgical Staff: 
Circ-1: Lori Al RN Scrub Tech-1: Giovani Zayas Surg Asst-1: Aleksander Freedman Surg Asst-2: Sujatha Rashid Event Time In Time Out Incision Start 1438 Incision Close 1552 Anesthesia: General  
Estimated Blood Loss: 20cc Specimens:  
ID Type Source Tests Collected by Time Destination 1 : endometrial polyp Preservative Uterus  Anisha Bryan MD 5/15/2019 1455 Pathology 2 : endometrial cuurrettings Preservative Uterus  Anisha Bryan MD 5/15/2019 1456 Pathology 3 : right ovarian cyst wall Preservative Ovary  Anisha Bryan MD 5/15/2019 1507 Pathology Findings: see full op report Complications: none Implants: * No implants in log *

## 2021-02-10 NOTE — DISCHARGE PLANNING
Meds-to-Beds: Discharge prescription orders listed below delivered to patient's bedside. RN notified. Patient counseled.     Patient verbalized understanding to take Xarelto 15 mg twice daily until finished then start the Xarelto 20 mg tablets once daily. Reviewed signs/symptoms of bleeding and when to seek medical attention. Reviewed potential drug-drug interactions.       Ravi Tripathi   Home Medication Instructions FRANCISCO:[id number]    Printed on:02/10/21 5188   Medication Information                      aspirin 81 MG EC tablet  Take 1 tablet by mouth every day.             atorvastatin (LIPITOR) 80 MG tablet  Take 1 tablet by mouth every evening.             metoprolol tartrate (LOPRESSOR) 25 MG Tab  Take 1 tablet by mouth 2 Times a Day.             rivaroxaban (XARELTO) 15 MG Tab tablet  Take 1 tablet by mouth 2 times a day, with meals.             rivaroxaban (XARELTO) 20 MG Tab tablet  Take 1 tablet by mouth with dinner.               Harmony Pagan, PharmD

## 2021-02-10 NOTE — DISCHARGE INSTRUCTIONS
Discharge Instructions    Discharged to other by car with escort. Discharged via wheelchair, hospital escort: Yes.  Special equipment needed: Not Applicable    Be sure to schedule a follow-up appointment with your primary care doctor or any specialists as instructed.     Discharge Plan:   Influenza Vaccine Indication: Patient Refuses    I understand that a diet low in cholesterol, fat, and sodium is recommended for good health. Unless I have been given specific instructions below for another diet, I accept this instruction as my diet prescription.   Other diet: as tolerated    Special Instructions:   Instructed by  to go [facility] for assistance    · Is patient discharged on Warfarin / Coumadin?   No     Depression / Suicide Risk    As you are discharged from this [facility] facility, it is important to learn how to keep safe from harming yourself.    Recognize the warning signs:  · Abrupt changes in personality, positive or negative- including increase in energy   · Giving away possessions  · Change in eating patterns- significant weight changes-  positive or negative  · Change in sleeping patterns- unable to sleep or sleeping all the time   · Unwillingness or inability to communicate  · Depression  · Unusual sadness, discouragement and loneliness  · Talk of wanting to die  · Neglect of personal appearance   · Rebelliousness- reckless behavior  · Withdrawal from people/activities they love  · Confusion- inability to concentrate     If you or a loved one observes any of these behaviors or has concerns about self-harm, here's what you can do:  · Talk about it- your feelings and reasons for harming yourself  · Remove any means that you might use to hurt yourself (examples: pills, rope, extension cords, firearm)  · Get professional help from the community (Mental Health, Substance Abuse, psychological counseling)  · Do not be alone:Call your Safe Contact- someone whom you trust who will be there for  you.  · Call your local CRISIS HOTLINE 747-2318 or 810-413-4920  · Call your local Children's Mobile Crisis Response Team [place] (427) 192-4056 or www.Quantance  · Call the toll free National Suicide Prevention Hotlines   · National Suicide Prevention Lifeline 359-965-KIOF (3598)  · [place] Hope Line Network 800-SUICIDE (108-4566)    Rivaroxaban oral tablets  What is this medicine?  RIVAROXABAN (ri va JAZZ a ban) is an anticoagulant (blood thinner). It is used to treat blood clots in the lungs or in the veins. It is also used to prevent blood clots in the lungs or in the veins. It is also used to lower the chance of stroke in people with a medical condition called atrial fibrillation.  This medicine may be used for other purposes; ask your health care provider or pharmacist if you have questions.  COMMON BRAND NAME(S): Xarelto, Xarelto Starter Pack  What should I tell my health care provider before I take this medicine?  They need to know if you have any of these conditions:  · antiphospholipid antibody syndrome  · artificial heart valve  · bleeding disorders  · bleeding in the brain  · blood in your stools (black or tarry stools) or if you have blood in your vomit  · history of blood clots  · history of stomach bleeding  · kidney disease  · liver disease  · low blood counts, like low white cell, platelet, or red cell counts  · recent or planned spinal or epidural procedure  · take medicines that treat or prevent blood clots  · an unusual or allergic reaction to rivaroxaban, other medicines, foods, dyes, or preservatives  · pregnant or trying to get pregnant  · breast-feeding  How should I use this medicine?  Take this medicine by mouth with a glass of water. Follow the directions on the prescription label. Take your medicine at regular intervals. Do not take it more often than directed. Do not stop taking except on your doctor's advice. Stopping this medicine may increase your risk of a blood clot. Be  sure to refill your prescription before you run out of medicine.  If you are taking this medicine after hip or knee replacement surgery, take it with or without food. If you are taking this medicine for atrial fibrillation, take it with your evening meal. If you are taking this medicine to treat blood clots, take it with food at the same time each day. If you are unable to swallow your tablet, you may crush the tablet and mix it in applesauce. Then, immediately eat the applesauce. You should eat more food right after you eat the applesauce containing the crushed tablet.  Talk to your pediatrician regarding the use of this medicine in children. Special care may be needed.  Overdosage: If you think you have taken too much of this medicine contact a poison control center or emergency room at once.  NOTE: This medicine is only for you. Do not share this medicine with others.  What if I miss a dose?  If you take your medicine once a day and miss a dose, take the missed dose as soon as you remember. If it is almost time for your next dose, take only that dose. Do not take double or extra doses.  If you take your medicine twice a day and miss a dose, take the missed dose immediately. In this instance, 2 tablets may be taken at the same time. The next day you should take 1 tablet twice a day as directed.  What may interact with this medicine?  Do not take this medicine with any of the following medications:  · defibrotide  This medicine may also interact with the following medications:  · aspirin and aspirin-like medicines  · certain antibiotics like erythromycin, azithromycin, and clarithromycin  · certain medicines for fungal infections like ketoconazole and itraconazole  · certain medicines for irregular heart beat like amiodarone, quinidine, dronedarone  · certain medicines for seizures like carbamazepine, phenytoin  · certain medicines that treat or prevent blood clots like warfarin, enoxaparin, and  dalteparin  · conivaptan  · felodipine  · indinavir  · lopinavir; ritonavir  · NSAIDS, medicines for pain and inflammation, like ibuprofen or naproxen  · ranolazine  · rifampin  · ritonavir  · SNRIs, medicines for depression, like desvenlafaxine, duloxetine, levomilnacipran, venlafaxine  · SSRIs, medicines for depression, like citalopram, escitalopram, fluoxetine, fluvoxamine, paroxetine, sertraline  · Fabiano's wort  · verapamil  This list may not describe all possible interactions. Give your health care provider a list of all the medicines, herbs, non-prescription drugs, or dietary supplements you use. Also tell them if you smoke, drink alcohol, or use illegal drugs. Some items may interact with your medicine.  What should I watch for while using this medicine?  Visit your healthcare professional for regular checks on your progress. You may need blood work done while you are taking this medicine. Your condition will be monitored carefully while you are receiving this medicine. It is important not to miss any appointments.  Avoid sports and activities that might cause injury while you are using this medicine. Severe falls or injuries can cause unseen bleeding. Be careful when using sharp tools or knives. Consider using an electric razor. Take special care brushing or flossing your teeth. Report any injuries, bruising, or red spots on the skin to your healthcare professional.  If you are going to need surgery or other procedure, tell your healthcare professional that you are taking this medicine.  Wear a medical ID bracelet or chain. Carry a card that describes your disease and details of your medicine and dosage times.  What side effects may I notice from receiving this medicine?  Side effects that you should report to your doctor or health care professional as soon as possible:  · allergic reactions like skin rash, itching or hives, swelling of the face, lips, or tongue  · back pain  · redness, blistering,  peeling or loosening of the skin, including inside the mouth  · signs and symptoms of bleeding such as bloody or black, tarry stools; red or dark-brown urine; spitting up blood or brown material that looks like coffee grounds; red spots on the skin; unusual bruising or bleeding from the eye, gums, or nose  · signs and symptoms of a blood clot such as chest pain; shortness of breath; pain, swelling, or warmth in the leg  · signs and symptoms of a stroke such as changes in vision; confusion; trouble speaking or understanding; severe headaches; sudden numbness or weakness of the face, arm or leg; trouble walking; dizziness; loss of coordination  Side effects that usually do not require medical attention (report to your doctor or health care professional if they continue or are bothersome):  · dizziness  · muscle pain  This list may not describe all possible side effects. Call your doctor for medical advice about side effects. You may report side effects to FDA at 9-676-JKB-0071.  Where should I keep my medicine?  Keep out of the reach of children.  Store at room temperature between 15 and 30 degrees C (59 and 86 degrees F). Throw away any unused medicine after the expiration date.  NOTE: This sheet is a summary. It may not cover all possible information. If you have questions about this medicine, talk to your doctor, pharmacist, or health care provider.  © 2020 Elsevier/Gold Standard (2020-03-16 09:45:59)        Pulmonary Embolism    A pulmonary embolism (PE) is a sudden blockage or decrease of blood flow in one or both lungs. Most blockages come from a blood clot that forms in the vein of a lower leg, thigh, or arm (deep vein thrombosis, DVT) and travels to the lungs. A clot is blood that has thickened into a gel or solid. PE is a dangerous and life-threatening condition that needs to be treated right away.  What are the causes?  This condition is usually caused by a blood clot that forms in a vein and moves to the  lungs. In rare cases, it may be caused by air, fat, part of a tumor, or other tissue that moves through the veins and into the lungs.  What increases the risk?  The following factors may make you more likely to develop this condition:  · Experiencing a traumatic injury, such as breaking a hip or leg.  · Having:  ? A spinal cord injury.  ? Orthopedic surgery, especially hip or knee replacement.  ? Any major surgery.  ? A stroke.  ? DVT.  ? Blood clots or blood clotting disease.  ? Long-term (chronic) lung or heart disease.  ? Cancer treated with chemotherapy.  ? A central venous catheter.  · Taking medicines that contain estrogen. These include birth control pills and hormone replacement therapy.  · Being:  ? Pregnant.  ? In the period of time after your baby is delivered (postpartum).  ? Older than age 60.  ? Overweight.  ? A smoker, especially if you have other risks.  What are the signs or symptoms?  Symptoms of this condition usually start suddenly and include:  · Shortness of breath during activity or at rest.  · Coughing, coughing up blood, or coughing up blood-tinged mucus.  · Chest pain that is often worse with deep breaths.  · Rapid or irregular heartbeat.  · Feeling light-headed or dizzy.  · Fainting.  · Feeling anxious.  · Fever.  · Sweating.  · Pain and swelling in a leg. This is a symptom of DVT, which can lead to PE.  How is this diagnosed?  This condition may be diagnosed based on:  · Your medical history.  · A physical exam.  · Blood tests.  · CT pulmonary angiogram. This test checks blood flow in and around your lungs.  · Ventilation-perfusion scan, also called a lung VQ scan. This test measures air flow and blood flow to the lungs.  · An ultrasound of the legs.  How is this treated?  Treatment for this condition depends on many factors, such as the cause of your PE, your risk for bleeding or developing more clots, and other medical conditions you have. Treatment aims to remove, dissolve, or stop  blood clots from forming or growing larger. Treatment may include:  · Medicines, such as:  ? Blood thinning medicines (anticoagulants) to stop clots from forming.  ? Medicines that dissolve clots (thrombolytics).  · Procedures, such as:  ? Using a flexible tube to remove a blood clot (embolectomy) or to deliver medicine to destroy it (catheter-directed thrombolysis).  ? Inserting a filter into a large vein that carries blood to the heart (inferior vena cava). This filter (vena cava filter) catches blood clots before they reach the lungs.  ? Surgery to remove the clot (surgical embolectomy). This is rare.  You may need a combination of immediate, long-term (up to 3 months after diagnosis), and extended (more than 3 months after diagnosis) treatments. Your treatment may continue for several months (maintenance therapy). You and your health care provider will work together to choose the treatment program that is best for you.  Follow these instructions at home:  Medicines  · Take over-the-counter and prescription medicines only as told by your health care provider.  · If you are taking an anticoagulant medicine:  ? Take the medicine every day at the same time each day.  ? Understand what foods and drugs interact with your medicine.  ? Understand the side effects of this medicine, including excessive bruising or bleeding. Ask your health care provider or pharmacist about other side effects.  General instructions  · Wear a medical alert bracelet or carry a medical alert card that says you have had a PE and lists what medicines you take.  · Ask your health care provider when you may return to your normal activities. Avoid sitting or lying for a long time without moving.  · Maintain a healthy weight. Ask your health care provider what weight is healthy for you.  · Do not use any products that contain nicotine or tobacco, such as cigarettes, e-cigarettes, and chewing tobacco. If you need help quitting, ask your health care  provider.  · Talk with your health care provider about any travel plans. It is important to make sure that you are still able to take your medicine while on trips.  · Keep all follow-up visits as told by your health care provider. This is important.  Contact a health care provider if:  · You missed a dose of your blood thinner medicine.  Get help right away if:  · You have:  ? New or increased pain, swelling, warmth, or redness in an arm or leg.  ? Numbness or tingling in an arm or leg.  ? Shortness of breath during activity or at rest.  ? A fever.  ? Chest pain.  ? A rapid or irregular heartbeat.  ? A severe headache.  ? Vision changes.  ? A serious fall or accident, or you hit your head.  ? Stomach (abdominal) pain.  ? Blood in your vomit, stool, or urine.  ? A cut that will not stop bleeding.  · You cough up blood.  · You feel light-headed or dizzy.  · You cannot move your arms or legs.  · You are confused or have memory loss.  These symptoms may represent a serious problem that is an emergency. Do not wait to see if the symptoms will go away. Get medical help right away. Call your local emergency services (911 in the [place] Do not drive yourself to the hospital.  Summary  · A pulmonary embolism (PE) is a sudden blockage or decrease of blood flow in one or both lungs. PE is a dangerous and life-threatening condition that needs to be treated right away.  · Treatments for this condition usually include medicines to thin your blood (anticoagulants) or medicines to break apart blood clots (thrombolytics).  · If you are given blood thinners, it is important to take the medicine every day at the same time each day.  · Understand what foods and drugs interact with any medicines that you are taking.  · If you have signs of PE or DVT, call your local emergency services (911 in the [place]  This information is not intended to replace advice given to you by your health care provider. Make sure you discuss any questions you  have with your health care provider.  Document Released: 12/15/2001 Document Revised: 09/25/2019 Document Reviewed: 09/25/2019  Elsevier Patient Education © 2020 Elsevier Inc.

## 2021-02-10 NOTE — DISCHARGE PLANNING
Anticipated Discharge Disposition: Shelter    Action: CM spoke with pt at bedside. Pt reports he is homeless, has no PCP, and has NOK. [facility] [address]. Pt reports he has no income but believes he has a stimulus check for $1800 at the shelter and will use that to rent a motel room if he is not able to get a cot at the shelter tonight. Pt states the [facility] is working with him to help him get Social Security Disability. Pt requests assistance with replacing his medications (which he reports were lost) at discharge.     Barriers to Discharge: Medical clearance pending  Meds to Beds delivery to bedside    Plan: Care coordination will follow and assist with discharge planning needs.       Care Transition Team Assessment    Information Source  Information Given By: Patient  Who is responsible for making decisions for patient? : Patient    Readmission Evaluation  Is this a readmission?: No    Elopement Risk  Legal Hold: No  Ambulatory or Self Mobile in Wheelchair: Yes  Disoriented: No  Psychiatric Symptoms: None  History of Wandering: No  Elopement this Admit: No  Vocalizing Wanting to Leave: No  Displays Behaviors, Body Language Wanting to Leave: No-Not at Risk for Elopement  Elopement Risk: Not at Risk for Elopement    Interdisciplinary Discharge Planning  Primary Care Physician: None  Lives with - Patient's Self Care Capacity: Alone and Able to Care For Self  Patient or legal guardian wants to designate a caregiver: No  Support Systems: Shelter  Housing / Facility: Homeless  Do You Take your Prescribed Medications Regularly: No  Reasons Why Not Taking Medications : (Lost his medications)  Able to Return to Previous ADL's: Yes  Mobility Issues: No  Prior Services: None    Discharge Preparedness  What is your plan after discharge?: Other (comment)(Shelter)  What are your discharge supports?: (None)  Prior Functional Level: Ambulatory, Independent with Activities of Daily Living,  Independent with Medication Management    Functional Assesment  Prior Functional Level: Ambulatory, Independent with Activities of Daily Living, Independent with Medication Management    Finances  Financial Barriers to Discharge: Yes  Average Monthly Income: 0 $  Source of Income: None  Prescription Coverage: Yes              Advance Directive  Advance Directive?: None    Domestic Abuse  Have you ever been the victim of abuse or violence?: No  Physical Abuse or Sexual Abuse: No  Verbal Abuse or Emotional Abuse: No  Possible Abuse/Neglect Reported to:: Not Applicable    Psychological Assessment  History of Substance Abuse: Methamphetamine(Opiods)    Discharge Risks or Barriers  Discharge risks or barriers?: Homeless / couch surfing, Non-adherence to medication or treatment, Lives alone, no community support, Uninsured / underinsured, No PCP, Substance abuse, Transportation  Patient risk factors: Vulnerable adult, Uninsured or underinsured, Substance abuse, No PCP, Noncompliance, Lives alone and no community support, Lack of outside supports, Homeless    Anticipated Discharge Information  Discharge Disposition: Discharged to home/self care (01)
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
## 2021-02-11 ENCOUNTER — HOSPITAL ENCOUNTER (EMERGENCY)
Dept: HOSPITAL 8 - ED | Age: 61
Discharge: HOME | End: 2021-02-11
Payer: SELF-PAY

## 2021-02-11 ENCOUNTER — HOSPITAL ENCOUNTER (EMERGENCY)
Facility: MEDICAL CENTER | Age: 61
End: 2021-02-11
Attending: EMERGENCY MEDICINE
Payer: MEDICAID

## 2021-02-11 VITALS — HEIGHT: 68 IN | WEIGHT: 171.96 LBS | BODY MASS INDEX: 26.06 KG/M2

## 2021-02-11 VITALS
HEIGHT: 68 IN | WEIGHT: 174.16 LBS | BODY MASS INDEX: 26.4 KG/M2 | DIASTOLIC BLOOD PRESSURE: 64 MMHG | TEMPERATURE: 98 F | HEART RATE: 70 BPM | OXYGEN SATURATION: 98 % | SYSTOLIC BLOOD PRESSURE: 102 MMHG | RESPIRATION RATE: 20 BRPM

## 2021-02-11 VITALS — SYSTOLIC BLOOD PRESSURE: 119 MMHG | DIASTOLIC BLOOD PRESSURE: 77 MMHG

## 2021-02-11 DIAGNOSIS — R07.89: Primary | ICD-10-CM

## 2021-02-11 DIAGNOSIS — Z59.00 HOMELESS: ICD-10-CM

## 2021-02-11 DIAGNOSIS — F17.210: ICD-10-CM

## 2021-02-11 DIAGNOSIS — F20.0 PARANOID SCHIZOPHRENIA (HCC): ICD-10-CM

## 2021-02-11 DIAGNOSIS — E78.5: ICD-10-CM

## 2021-02-11 LAB
ALBUMIN SERPL-MCNC: 3.7 G/DL (ref 3.4–5)
AMPHET UR QL SCN: POSITIVE
ANION GAP SERPL CALC-SCNC: 8 MMOL/L (ref 5–15)
BARBITURATES UR QL SCN: NEGATIVE
BASOPHILS # BLD AUTO: 0.1 X10^3/UL (ref 0–0.1)
BASOPHILS NFR BLD AUTO: 1 % (ref 0–1)
BENZODIAZ UR QL SCN: NEGATIVE
BZE UR QL SCN: NEGATIVE
CALCIUM SERPL-MCNC: 9.1 MG/DL (ref 8.5–10.1)
CANNABINOIDS UR QL SCN: NEGATIVE
CHLORIDE SERPL-SCNC: 110 MMOL/L (ref 98–107)
CREAT SERPL-MCNC: 1.16 MG/DL (ref 0.7–1.3)
EOSINOPHIL # BLD AUTO: 0.1 X10^3/UL (ref 0–0.4)
EOSINOPHIL NFR BLD AUTO: 1 % (ref 1–7)
ERYTHROCYTE [DISTWIDTH] IN BLOOD BY AUTOMATED COUNT: 14.2 % (ref 9.4–14.8)
LYMPHOCYTES # BLD AUTO: 1.4 X10^3/UL (ref 1–3.4)
LYMPHOCYTES NFR BLD AUTO: 15 % (ref 22–44)
MCH RBC QN AUTO: 31.2 PG (ref 27.5–34.5)
MCHC RBC AUTO-ENTMCNC: 33.9 G/DL (ref 33.2–36.2)
MD: NO
METHADONE UR QL SCN: NEGATIVE
MONOCYTES # BLD AUTO: 0.7 X10^3/UL (ref 0.2–0.8)
MONOCYTES NFR BLD AUTO: 8 % (ref 2–9)
NEUTROPHILS # BLD AUTO: 6.9 X10^3/UL (ref 1.8–6.8)
NEUTROPHILS NFR BLD AUTO: 75 % (ref 42–75)
OPIATES UR QL SCN: POSITIVE
OXYCODONE UR QL SCN: NEGATIVE
PCP UR QL SCN: NEGATIVE
PLATELET # BLD AUTO: 277 X10^3/UL (ref 130–400)
PMV BLD AUTO: 7.6 FL (ref 7.4–10.4)
POC BREATHALIZER: 0 PERCENT (ref 0–0.01)
PROPOXYPH UR QL SCN: NEGATIVE
RBC # BLD AUTO: 4.42 X10^6/UL (ref 4.38–5.82)
TROPONIN I SERPL-MCNC: < 0.015 NG/ML (ref 0–0.04)

## 2021-02-11 PROCEDURE — 90791 PSYCH DIAGNOSTIC EVALUATION: CPT

## 2021-02-11 PROCEDURE — 700102 HCHG RX REV CODE 250 W/ 637 OVERRIDE(OP): Performed by: EMERGENCY MEDICINE

## 2021-02-11 PROCEDURE — 36415 COLL VENOUS BLD VENIPUNCTURE: CPT

## 2021-02-11 PROCEDURE — 99406 BEHAV CHNG SMOKING 3-10 MIN: CPT

## 2021-02-11 PROCEDURE — 93005 ELECTROCARDIOGRAM TRACING: CPT

## 2021-02-11 PROCEDURE — 85025 COMPLETE CBC W/AUTO DIFF WBC: CPT

## 2021-02-11 PROCEDURE — 99285 EMERGENCY DEPT VISIT HI MDM: CPT

## 2021-02-11 PROCEDURE — 82040 ASSAY OF SERUM ALBUMIN: CPT

## 2021-02-11 PROCEDURE — A9270 NON-COVERED ITEM OR SERVICE: HCPCS | Performed by: EMERGENCY MEDICINE

## 2021-02-11 PROCEDURE — 302970 POC BREATHALIZER: Performed by: EMERGENCY MEDICINE

## 2021-02-11 PROCEDURE — 80048 BASIC METABOLIC PNL TOTAL CA: CPT

## 2021-02-11 PROCEDURE — 71045 X-RAY EXAM CHEST 1 VIEW: CPT

## 2021-02-11 PROCEDURE — 80307 DRUG TEST PRSMV CHEM ANLYZR: CPT

## 2021-02-11 PROCEDURE — 84484 ASSAY OF TROPONIN QUANT: CPT

## 2021-02-11 RX ORDER — QUETIAPINE FUMARATE 25 MG/1
50 TABLET, FILM COATED ORAL ONCE
Status: COMPLETED | OUTPATIENT
Start: 2021-02-11 | End: 2021-02-11

## 2021-02-11 RX ORDER — LORAZEPAM 1 MG/1
1 TABLET ORAL ONCE
Status: COMPLETED | OUTPATIENT
Start: 2021-02-11 | End: 2021-02-11

## 2021-02-11 RX ORDER — QUETIAPINE FUMARATE 200 MG/1
600 TABLET, FILM COATED ORAL ONCE
Status: COMPLETED | OUTPATIENT
Start: 2021-02-11 | End: 2021-02-11

## 2021-02-11 RX ORDER — HALOPERIDOL 5 MG/1
5 TABLET ORAL ONCE
Status: COMPLETED | OUTPATIENT
Start: 2021-02-11 | End: 2021-02-11

## 2021-02-11 RX ORDER — FLUOXETINE HYDROCHLORIDE 20 MG/1
20 CAPSULE ORAL ONCE
Status: COMPLETED | OUTPATIENT
Start: 2021-02-11 | End: 2021-02-11

## 2021-02-11 RX ADMIN — RIVAROXABAN 15 MG: 15 TABLET, FILM COATED ORAL at 06:13

## 2021-02-11 RX ADMIN — QUETIAPINE FUMARATE 600 MG: 200 TABLET, FILM COATED ORAL at 10:30

## 2021-02-11 RX ADMIN — FLUOXETINE 20 MG: 20 CAPSULE ORAL at 10:30

## 2021-02-11 RX ADMIN — QUETIAPINE FUMARATE 50 MG: 25 TABLET ORAL at 05:01

## 2021-02-11 RX ADMIN — LORAZEPAM 1 MG: 1 TABLET ORAL at 03:21

## 2021-02-11 RX ADMIN — HALOPERIDOL 5 MG: 5 TABLET ORAL at 03:21

## 2021-02-11 SDOH — ECONOMIC STABILITY - HOUSING INSECURITY: HOMELESSNESS UNSPECIFIED: Z59.00

## 2021-02-11 ASSESSMENT — FIBROSIS 4 INDEX: FIB4 SCORE: 1.73

## 2021-02-11 NOTE — ED NOTES
Patient understands discharge instructions. Given all DC education, prescriptions, f/u appointments. Pt verbalized understanding.  In no distress. IV and telemetry dc'd. Has all belongings.  Ambulating well with steady gait. Will return for worsening symptoms.    Escorted with wheelchair out to [facility] van, patient ambulating but states his legs hurt. Pt was then able to climb two stairs into the van with minimal assistance. Pt has his dc instructions with him, and his birth certificate (he dropped it on the way out, it was given back to him)    Encouraged to return to the ER if needed.
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
## 2021-02-11 NOTE — NUR
BIBA, CC OF PANIC ATTACK FROM ANGINA PAIN DESCRIBED AS SHARP 8.5/10 IN CENTER 
CHEST. PT IS HOMELESS AND WAS WALKING AROUND FOR ABOUT 15 MIN WITHOUT BEING 
ABLE TO FIND ANYONE TO ASK FOR HELP. PT WAS GIVEN 324 ASPIRIN AND 1 NITRO TAB 
FROM EMS AND PAIN REDUCED TO 5.5/10, VSS FOR EMS WITH LAST BP BEING 112/70. PT 
STATES HE WAS DIAGNOSED WITH A BLOOD CLOT IN HIS LUNG A FEW WEEKS AGO AND SPIT 
UP BLOOD ONE TIME TODAY, HE IS NOT ON BLOOD THINNERS AND IS UNSURE IF HE IS 
SUPPOSED TO BE. PT ALSO STATES HIS MEDICATIONS WERE STOLEN A FEW WEEKS AGO.

## 2021-02-11 NOTE — ED PROVIDER NOTES
ED Provider Note    Patient seen and evaluated by the alert team, well-known to them, the patient is well established with [facility] care, saw the psychiatrist on Monday.  Did some methamphetamine and apparently was disorganized last night.  The patient is much more awake and alert, future oriented, no suicidal homicidal ideation.  Will discharge the patient home, no indication for legal hold, have the release the patient from legal hold.  The patient will be transported to [facility] for crisis stabilization.     The patient will return for new or worsening symptoms and is stable at the time of discharge.    The patient is referred to a primary physician for blood pressure management, diabetic screening, and for all other preventative health concerns.        DISPOSITION:  Patient will be discharged home in stable condition.    FOLLOW UP:  No follow-up provider specified.    OUTPATIENT MEDICATIONS:  New Prescriptions    No medications on file

## 2021-02-11 NOTE — ED NOTES
Pt ambulatory to restroom, even and steady gait noted. Urine specimen cup provided. Urine collected and sent to lab. Pt back in room now, sitter outside room with unobstructed view of pt.

## 2021-02-11 NOTE — DISCHARGE PLANNING
Medical Social Work    MSW received a copy of pt's legal hold from Alert Team.  Alert Team assessment is not complete.  Unable to send mental health referrals until Alert Team assessment is complete and in chart.  Per Nolan pt is too sedated for assessment at this time.

## 2021-02-11 NOTE — ED NOTES
"VS reassessed, VSS. Pt states, \"the  are going to take me to the [employer] bus at 6 in the morning so I can go see my dead moms grave in [place]. I have to get out of here by morning.\" Alert team RN back to bedside.   "

## 2021-02-11 NOTE — ED NOTES
Pt resting quietly on right side with eyes closed, full even resp noted. Awaiting alert team eval.    - Has increased O2 requirements  - concern for aspiration pna vs chemical pneumonitis   - aspiration precautions, elevated head of bed, chest PT  - afebrile, sent bcx and ucx in ED, no need for abx at this time
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
## 2021-02-11 NOTE — ED NOTES
Patient resting on stretcher, respirations even and unlabored. Pt in direct line of sight of constant observer. Pt has no further needs at this time.
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
## 2021-02-11 NOTE — ED NOTES
AM meds given per ERP orders. Pt sitting up in [facility] eating ED meal. Denies additional needs at this time.
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
## 2021-02-13 ENCOUNTER — HOSPITAL ENCOUNTER (EMERGENCY)
Dept: HOSPITAL 8 - ED | Age: 61
LOS: 1 days | Discharge: PSYCHIATRIC OR SUBSTANCE USE FACILITY | End: 2021-02-14
Payer: MEDICAID

## 2021-02-13 VITALS — DIASTOLIC BLOOD PRESSURE: 66 MMHG | SYSTOLIC BLOOD PRESSURE: 121 MMHG

## 2021-02-13 VITALS — HEIGHT: 66 IN | WEIGHT: 150.31 LBS | BODY MASS INDEX: 24.16 KG/M2

## 2021-02-13 DIAGNOSIS — R45.851: ICD-10-CM

## 2021-02-13 DIAGNOSIS — Z91.14: ICD-10-CM

## 2021-02-13 DIAGNOSIS — Z86.711: ICD-10-CM

## 2021-02-13 DIAGNOSIS — Z20.822: ICD-10-CM

## 2021-02-13 DIAGNOSIS — Z72.9: ICD-10-CM

## 2021-02-13 DIAGNOSIS — E78.5: ICD-10-CM

## 2021-02-13 DIAGNOSIS — F15.151: Primary | ICD-10-CM

## 2021-02-13 DIAGNOSIS — R44.1: ICD-10-CM

## 2021-02-13 DIAGNOSIS — F17.200: ICD-10-CM

## 2021-02-13 LAB
ALBUMIN SERPL-MCNC: 3.5 G/DL (ref 3.4–5)
ALP SERPL-CCNC: 118 U/L (ref 45–117)
ALT SERPL-CCNC: 43 U/L (ref 12–78)
ANION GAP SERPL CALC-SCNC: 9 MMOL/L (ref 5–15)
BASOPHILS # BLD AUTO: 0.1 X10^3/UL (ref 0–0.1)
BASOPHILS NFR BLD AUTO: 1 % (ref 0–1)
BILIRUB SERPL-MCNC: 0.9 MG/DL (ref 0.2–1)
CALCIUM SERPL-MCNC: 8.5 MG/DL (ref 8.5–10.1)
CHLORIDE SERPL-SCNC: 106 MMOL/L (ref 98–107)
CREAT SERPL-MCNC: 1.21 MG/DL (ref 0.7–1.3)
EOSINOPHIL # BLD AUTO: 0 X10^3/UL (ref 0–0.4)
EOSINOPHIL NFR BLD AUTO: 0 % (ref 1–7)
ERYTHROCYTE [DISTWIDTH] IN BLOOD BY AUTOMATED COUNT: 14.2 % (ref 9.4–14.8)
LYMPHOCYTES # BLD AUTO: 1.6 X10^3/UL (ref 1–3.4)
LYMPHOCYTES NFR BLD AUTO: 12 % (ref 22–44)
MCH RBC QN AUTO: 31.1 PG (ref 27.5–34.5)
MCHC RBC AUTO-ENTMCNC: 33.8 G/DL (ref 33.2–36.2)
MD: NO
MONOCYTES # BLD AUTO: 1.4 X10^3/UL (ref 0.2–0.8)
MONOCYTES NFR BLD AUTO: 10 % (ref 2–9)
NEUTROPHILS # BLD AUTO: 10.3 X10^3/UL (ref 1.8–6.8)
NEUTROPHILS NFR BLD AUTO: 77 % (ref 42–75)
PLATELET # BLD AUTO: 328 X10^3/UL (ref 130–400)
PMV BLD AUTO: 8 FL (ref 7.4–10.4)
PROT SERPL-MCNC: 6.8 G/DL (ref 6.4–8.2)
RBC # BLD AUTO: 4.03 X10^6/UL (ref 4.38–5.82)
VANCOMYCIN TROUGH SERPL-MCNC: 2.3 MG/DL (ref 2.8–20)

## 2021-02-13 PROCEDURE — 81003 URINALYSIS AUTO W/O SCOPE: CPT

## 2021-02-13 PROCEDURE — 87426 SARSCOV CORONAVIRUS AG IA: CPT

## 2021-02-13 PROCEDURE — 80307 DRUG TEST PRSMV CHEM ANLYZR: CPT

## 2021-02-13 PROCEDURE — G0480 DRUG TEST DEF 1-7 CLASSES: HCPCS

## 2021-02-13 PROCEDURE — 80053 COMPREHEN METABOLIC PANEL: CPT

## 2021-02-13 PROCEDURE — 71045 X-RAY EXAM CHEST 1 VIEW: CPT

## 2021-02-13 PROCEDURE — 85025 COMPLETE CBC W/AUTO DIFF WBC: CPT

## 2021-02-13 PROCEDURE — 80320 DRUG SCREEN QUANTALCOHOLS: CPT

## 2021-02-13 PROCEDURE — 36415 COLL VENOUS BLD VENIPUNCTURE: CPT

## 2021-02-13 PROCEDURE — 80329 ANALGESICS NON-OPIOID 1 OR 2: CPT

## 2021-02-13 PROCEDURE — 99285 EMERGENCY DEPT VISIT HI MDM: CPT

## 2021-02-13 PROCEDURE — 80299 QUANTITATIVE ASSAY DRUG: CPT

## 2021-02-13 NOTE — NUR
PT CLOOTHING AND BELONGING'S PLACED IN A BAG AND SECURED IN LOCKER. PT IN 
SECURE ROOM. SITTER TO SUPERVISE. DIET TRAY ORDERED FOR PT. BLANKETS PROVIDED.

## 2021-02-13 NOTE — NUR
TP: PT REFUSED BY [facility] DUE TO INSURANCE. PER [facility] PT IS SELF PAY

## 2021-02-13 NOTE — NUR
TP: SPOKE WITH CALEB STEEN FROM [facility]. TRYING TO VERIFY INSURANCE. OK TO FAX PACKET 
TO OTHER FACILITIES

## 2021-02-14 ENCOUNTER — HOSPITAL ENCOUNTER (INPATIENT)
Dept: HOSPITAL 8 - 3E | Age: 61
LOS: 11 days | Discharge: HOME | DRG: 885 | End: 2021-02-25
Attending: PSYCHIATRY & NEUROLOGY | Admitting: PSYCHIATRY & NEUROLOGY
Payer: MEDICAID

## 2021-02-14 VITALS — BODY MASS INDEX: 25.36 KG/M2 | HEIGHT: 68 IN | WEIGHT: 167.33 LBS

## 2021-02-14 VITALS — SYSTOLIC BLOOD PRESSURE: 112 MMHG | DIASTOLIC BLOOD PRESSURE: 75 MMHG

## 2021-02-14 DIAGNOSIS — F20.0: Primary | ICD-10-CM

## 2021-02-14 DIAGNOSIS — F32.9: ICD-10-CM

## 2021-02-14 DIAGNOSIS — F17.210: ICD-10-CM

## 2021-02-14 DIAGNOSIS — Z86.711: ICD-10-CM

## 2021-02-14 DIAGNOSIS — J44.9: ICD-10-CM

## 2021-02-14 LAB — MICROSCOPIC: (no result)

## 2021-02-14 PROCEDURE — 80061 LIPID PANEL: CPT

## 2021-02-14 PROCEDURE — 82565 ASSAY OF CREATININE: CPT

## 2021-02-14 PROCEDURE — 84520 ASSAY OF UREA NITROGEN: CPT

## 2021-02-14 PROCEDURE — 84439 ASSAY OF FREE THYROXINE: CPT

## 2021-02-14 PROCEDURE — 36415 COLL VENOUS BLD VENIPUNCTURE: CPT

## 2021-02-14 PROCEDURE — 82140 ASSAY OF AMMONIA: CPT

## 2021-02-14 RX ADMIN — ACETAMINOPHEN PRN MG: 325 TABLET, FILM COATED ORAL at 21:18

## 2021-02-14 NOTE — NUR
-------------------------------------------------------------------------------

           *** Note ayah in EDM - 02/14/21 at 1428 by BNICHOLS ***           

-------------------------------------------------------------------------------

TASK RN: ROOM SECURED. PT REMAINS ON MONITORS. SITTER REMAINS AT BEDSIDE. PT 
REMAINS LETHARGIC AFTER MED ADMIN AT [facility]. WILL HOLD OFF ON POTASSIUM PILLS AT 
THIS TIME.

## 2021-02-14 NOTE — NUR
PT PLACED ON HOSPITAL BED. WARM BLANKETS PROVIDED. PT TEARFUL STATING "I HAVE 
BEEN HOMELESS FOR 17 YEARS LIVING OUT OF GARBAGE CANS. I JUST CANT TAKE IT 
ANYMORE".

## 2021-02-14 NOTE — NUR
TASK RN: PT RESTING ON CRISTIAN ROSARIO. SITTER REMAINS AT BEDSIDE. ROOM REMAINS 
SECURE. PT PROVIDED W/ SI LUNCH TRAY.

## 2021-02-14 NOTE — NUR
PER CHRIS PT NAME INCORRECT IN SYSTEM POSSIBLY CAUSING INSURANCE DENIAL AT 
ADMITTING FACILITIES. ADMITTING TO UPDATE NAME.  TO RESUBMIT 
REFERRAL REQUEST WITH UPDATED INFORMATION

## 2021-02-14 NOTE — NUR
ATTEMPTED TO [place] AND PT KEPT SAYING "IM 
HALLUCINATING TOO MUCH, I DONT WANT TO MOVE, IM FINE RIGHT HERE". WILL ATTEMPT 
AT A LATER TIME

## 2021-02-14 NOTE — NUR
PT SLEEPING ON GURNEY. SOFT DIET TRAY ORDERED AS PT STATES HE HAS NO TEETH AND 
HIS GUMS ARE PAINFUL. SOFT SNACKS PROVIDED. PSA W/I VISIBLE DISTANCE

## 2021-02-14 NOTE — NUR
PSA AT BEDSIDE. PT STATES "I JUST WANT TO DIE" REFUSING FOOD, VITAL SIGNS AND 
CARE AT THIS TIME. 

RN ATTEMPTED TO CLEAN BILATERAL FOOT WOUNDS. PER PT. "PLEASE WAIT. IM TIRED AND 
I JUST WANT TO SLEEP AT THIS TIME.

## 2021-02-14 NOTE — NUR
PT SITTING UP IN BED EATING LUNCH. PT WILLING TO TAKE ORDERED MEDICATIONS AT 
THIS TIME. PSA W/IN VISIBLE DISTANCE. NAD.

## 2021-02-14 NOTE — NUR
RECEIVED REPORT FROM BJORN STEEN. ASSUMING CARE AT THIS TIME. PT SLEEPING ON 
HOSPITAL BED. RESP EVEN AND UNLABORED. NADN. PT IN SIGHT OF SITTER. ROOM 
SECURE.

## 2021-02-14 NOTE — NUR
UNABLE TO TAKE VITAL SIGNS AT THIS TIME. PT STATING "JUST LET ME SLEEP". WILL 
MONITOR AND REATTEMPT

## 2021-02-14 NOTE — NUR
PT STATES "I DONT WANT ANY MEDICATIONS. I DONT WANT ANYTHING TO TREAT THE CLOT 
IN MY LUNGS. I JUST WANT TO DIE."

## 2021-02-14 NOTE — NUR
ASSUMED CARE OF PATIENT. REPORT TAKEN FROM DEEP. PSA AT BEDSIDE. PT RESTING ON 
RI[place]ine. NAD. WILL CONTINUE TO MONITOR

## 2021-02-14 NOTE — NUR
PT RESTING ON GUPOOL. STATES "MY HANDS ARE SWOLLEN AND I HAVE BLISTERS ON MY 
FEET THAT HURT. I JUST WANT TO LAY HERE AND BE LEFT ALONE".

PT W/IN VISION OF PSA.

## 2021-02-15 VITALS — DIASTOLIC BLOOD PRESSURE: 75 MMHG | SYSTOLIC BLOOD PRESSURE: 112 MMHG

## 2021-02-15 VITALS — SYSTOLIC BLOOD PRESSURE: 114 MMHG | DIASTOLIC BLOOD PRESSURE: 72 MMHG

## 2021-02-15 VITALS — SYSTOLIC BLOOD PRESSURE: 126 MMHG | DIASTOLIC BLOOD PRESSURE: 85 MMHG

## 2021-02-15 LAB
CHOL/HDL RATIO: 2.2
HDL CHOL %: 45 % (ref 26–37)
HDL CHOLESTEROL (DIRECT): 70 MG/DL (ref 40–60)
LDL CHOLESTEROL,CALCULATED: 65 MG/DL (ref 54–169)
LDLC/HDLC SERPL: 0.9 {RATIO} (ref 0.5–3)
T4 FREE SERPL-MCNC: 1.03 NG/DL (ref 0.76–1.46)
TRIGL SERPL-MCNC: 105 MG/DL (ref 50–200)
VLDLC SERPL CALC-MCNC: 21 MG/DL (ref 0–25)

## 2021-02-15 RX ADMIN — ATORVASTATIN CALCIUM SCH MG: 10 TABLET, FILM COATED ORAL at 20:29

## 2021-02-15 RX ADMIN — CEPHALEXIN SCH MG: 500 CAPSULE ORAL at 05:35

## 2021-02-15 RX ADMIN — ACETAMINOPHEN PRN MG: 325 TABLET, FILM COATED ORAL at 20:30

## 2021-02-15 RX ADMIN — AMOXICILLIN AND CLAVULANATE POTASSIUM SCH TAB: 875; 125 TABLET, FILM COATED ORAL at 20:30

## 2021-02-15 RX ADMIN — QUETIAPINE FUMARATE SCH MG: 100 TABLET ORAL at 20:30

## 2021-02-15 RX ADMIN — AMOXICILLIN AND CLAVULANATE POTASSIUM SCH TAB: 875; 125 TABLET, FILM COATED ORAL at 12:35

## 2021-02-15 RX ADMIN — CEPHALEXIN SCH MG: 500 CAPSULE ORAL at 00:38

## 2021-02-16 VITALS — SYSTOLIC BLOOD PRESSURE: 110 MMHG | DIASTOLIC BLOOD PRESSURE: 79 MMHG

## 2021-02-16 VITALS — SYSTOLIC BLOOD PRESSURE: 107 MMHG | DIASTOLIC BLOOD PRESSURE: 74 MMHG

## 2021-02-16 RX ADMIN — ATORVASTATIN CALCIUM SCH MG: 10 TABLET, FILM COATED ORAL at 20:42

## 2021-02-16 RX ADMIN — ACETAMINOPHEN PRN MG: 325 TABLET, FILM COATED ORAL at 20:42

## 2021-02-16 RX ADMIN — AMOXICILLIN AND CLAVULANATE POTASSIUM SCH TAB: 875; 125 TABLET, FILM COATED ORAL at 08:37

## 2021-02-16 RX ADMIN — FLUOXETINE HYDROCHLORIDE SCH MG: 20 CAPSULE ORAL at 08:37

## 2021-02-16 RX ADMIN — AMOXICILLIN AND CLAVULANATE POTASSIUM SCH TAB: 875; 125 TABLET, FILM COATED ORAL at 20:42

## 2021-02-16 RX ADMIN — QUETIAPINE FUMARATE SCH MG: 100 TABLET ORAL at 20:42

## 2021-02-17 VITALS — SYSTOLIC BLOOD PRESSURE: 129 MMHG | DIASTOLIC BLOOD PRESSURE: 82 MMHG

## 2021-02-17 VITALS — DIASTOLIC BLOOD PRESSURE: 90 MMHG | SYSTOLIC BLOOD PRESSURE: 142 MMHG

## 2021-02-17 RX ADMIN — ACETAMINOPHEN PRN MG: 325 TABLET, FILM COATED ORAL at 09:24

## 2021-02-17 RX ADMIN — AMOXICILLIN AND CLAVULANATE POTASSIUM SCH TAB: 875; 125 TABLET, FILM COATED ORAL at 20:43

## 2021-02-17 RX ADMIN — AMOXICILLIN AND CLAVULANATE POTASSIUM SCH TAB: 875; 125 TABLET, FILM COATED ORAL at 09:23

## 2021-02-17 RX ADMIN — QUETIAPINE FUMARATE SCH MG: 100 TABLET ORAL at 20:44

## 2021-02-17 RX ADMIN — ATORVASTATIN CALCIUM SCH MG: 10 TABLET, FILM COATED ORAL at 20:43

## 2021-02-17 RX ADMIN — FLUOXETINE HYDROCHLORIDE SCH MG: 20 CAPSULE ORAL at 09:23

## 2021-02-18 VITALS — SYSTOLIC BLOOD PRESSURE: 120 MMHG | DIASTOLIC BLOOD PRESSURE: 76 MMHG

## 2021-02-18 VITALS — SYSTOLIC BLOOD PRESSURE: 123 MMHG | DIASTOLIC BLOOD PRESSURE: 79 MMHG

## 2021-02-18 RX ADMIN — AMOXICILLIN AND CLAVULANATE POTASSIUM SCH TAB: 875; 125 TABLET, FILM COATED ORAL at 20:33

## 2021-02-18 RX ADMIN — FLUOXETINE HYDROCHLORIDE SCH MG: 20 CAPSULE ORAL at 09:23

## 2021-02-18 RX ADMIN — RIVAROXABAN SCH MG: 15 TABLET, FILM COATED ORAL at 17:02

## 2021-02-18 RX ADMIN — ATORVASTATIN CALCIUM SCH MG: 10 TABLET, FILM COATED ORAL at 20:34

## 2021-02-18 RX ADMIN — QUETIAPINE FUMARATE SCH MG: 100 TABLET ORAL at 20:34

## 2021-02-18 RX ADMIN — AMOXICILLIN AND CLAVULANATE POTASSIUM SCH TAB: 875; 125 TABLET, FILM COATED ORAL at 09:23

## 2021-02-18 RX ADMIN — ACETAMINOPHEN PRN MG: 325 TABLET, FILM COATED ORAL at 20:33

## 2021-02-19 VITALS — SYSTOLIC BLOOD PRESSURE: 110 MMHG | DIASTOLIC BLOOD PRESSURE: 77 MMHG

## 2021-02-19 VITALS — SYSTOLIC BLOOD PRESSURE: 105 MMHG | DIASTOLIC BLOOD PRESSURE: 69 MMHG

## 2021-02-19 LAB — CREAT SERPL-MCNC: 0.89 MG/DL (ref 0.7–1.3)

## 2021-02-19 RX ADMIN — ATORVASTATIN CALCIUM SCH MG: 10 TABLET, FILM COATED ORAL at 20:25

## 2021-02-19 RX ADMIN — AMOXICILLIN AND CLAVULANATE POTASSIUM SCH TAB: 875; 125 TABLET, FILM COATED ORAL at 07:50

## 2021-02-19 RX ADMIN — AMOXICILLIN AND CLAVULANATE POTASSIUM SCH TAB: 875; 125 TABLET, FILM COATED ORAL at 20:25

## 2021-02-19 RX ADMIN — RIVAROXABAN SCH MG: 15 TABLET, FILM COATED ORAL at 07:50

## 2021-02-19 RX ADMIN — FLUOXETINE HYDROCHLORIDE SCH MG: 20 CAPSULE ORAL at 07:50

## 2021-02-19 RX ADMIN — QUETIAPINE FUMARATE SCH MG: 100 TABLET ORAL at 20:26

## 2021-02-19 RX ADMIN — ACETAMINOPHEN PRN MG: 325 TABLET, FILM COATED ORAL at 20:25

## 2021-02-19 RX ADMIN — RIVAROXABAN SCH MG: 15 TABLET, FILM COATED ORAL at 16:44

## 2021-02-20 VITALS — DIASTOLIC BLOOD PRESSURE: 63 MMHG | SYSTOLIC BLOOD PRESSURE: 112 MMHG

## 2021-02-20 VITALS — SYSTOLIC BLOOD PRESSURE: 114 MMHG | DIASTOLIC BLOOD PRESSURE: 77 MMHG

## 2021-02-20 RX ADMIN — FLUOXETINE HYDROCHLORIDE SCH MG: 20 CAPSULE ORAL at 07:40

## 2021-02-20 RX ADMIN — RIVAROXABAN SCH MG: 15 TABLET, FILM COATED ORAL at 07:40

## 2021-02-20 RX ADMIN — ACETAMINOPHEN PRN MG: 325 TABLET, FILM COATED ORAL at 20:24

## 2021-02-20 RX ADMIN — QUETIAPINE FUMARATE SCH MG: 100 TABLET ORAL at 20:23

## 2021-02-20 RX ADMIN — ATORVASTATIN CALCIUM SCH MG: 10 TABLET, FILM COATED ORAL at 20:23

## 2021-02-20 RX ADMIN — AMOXICILLIN AND CLAVULANATE POTASSIUM SCH TAB: 875; 125 TABLET, FILM COATED ORAL at 07:40

## 2021-02-20 RX ADMIN — AMOXICILLIN AND CLAVULANATE POTASSIUM SCH TAB: 875; 125 TABLET, FILM COATED ORAL at 20:23

## 2021-02-20 RX ADMIN — RIVAROXABAN SCH MG: 15 TABLET, FILM COATED ORAL at 16:58

## 2021-02-21 VITALS — SYSTOLIC BLOOD PRESSURE: 111 MMHG | DIASTOLIC BLOOD PRESSURE: 73 MMHG

## 2021-02-21 VITALS — SYSTOLIC BLOOD PRESSURE: 118 MMHG | DIASTOLIC BLOOD PRESSURE: 74 MMHG

## 2021-02-21 RX ADMIN — QUETIAPINE FUMARATE SCH MG: 100 TABLET ORAL at 20:29

## 2021-02-21 RX ADMIN — FLUOXETINE HYDROCHLORIDE SCH MG: 20 CAPSULE ORAL at 07:38

## 2021-02-21 RX ADMIN — ACETAMINOPHEN PRN MG: 325 TABLET, FILM COATED ORAL at 20:56

## 2021-02-21 RX ADMIN — RIVAROXABAN SCH MG: 15 TABLET, FILM COATED ORAL at 07:38

## 2021-02-21 RX ADMIN — ATORVASTATIN CALCIUM SCH MG: 10 TABLET, FILM COATED ORAL at 20:30

## 2021-02-21 RX ADMIN — AMOXICILLIN AND CLAVULANATE POTASSIUM SCH TAB: 875; 125 TABLET, FILM COATED ORAL at 20:29

## 2021-02-21 RX ADMIN — RIVAROXABAN SCH MG: 15 TABLET, FILM COATED ORAL at 17:02

## 2021-02-21 RX ADMIN — AMOXICILLIN AND CLAVULANATE POTASSIUM SCH TAB: 875; 125 TABLET, FILM COATED ORAL at 07:38

## 2021-02-22 VITALS — DIASTOLIC BLOOD PRESSURE: 70 MMHG | SYSTOLIC BLOOD PRESSURE: 113 MMHG

## 2021-02-22 VITALS — DIASTOLIC BLOOD PRESSURE: 77 MMHG | SYSTOLIC BLOOD PRESSURE: 118 MMHG

## 2021-02-22 RX ADMIN — FLUOXETINE HYDROCHLORIDE SCH MG: 20 CAPSULE ORAL at 08:56

## 2021-02-22 RX ADMIN — AMOXICILLIN AND CLAVULANATE POTASSIUM SCH TAB: 875; 125 TABLET, FILM COATED ORAL at 08:56

## 2021-02-22 RX ADMIN — RIVAROXABAN SCH MG: 15 TABLET, FILM COATED ORAL at 08:56

## 2021-02-22 RX ADMIN — QUETIAPINE FUMARATE SCH MG: 100 TABLET ORAL at 20:35

## 2021-02-22 RX ADMIN — ACETAMINOPHEN PRN MG: 325 TABLET, FILM COATED ORAL at 20:35

## 2021-02-22 RX ADMIN — ATORVASTATIN CALCIUM SCH MG: 10 TABLET, FILM COATED ORAL at 20:34

## 2021-02-22 RX ADMIN — RIVAROXABAN SCH MG: 15 TABLET, FILM COATED ORAL at 17:10

## 2021-02-23 VITALS — SYSTOLIC BLOOD PRESSURE: 114 MMHG | DIASTOLIC BLOOD PRESSURE: 71 MMHG

## 2021-02-23 VITALS — SYSTOLIC BLOOD PRESSURE: 114 MMHG | DIASTOLIC BLOOD PRESSURE: 72 MMHG

## 2021-02-23 RX ADMIN — ATORVASTATIN CALCIUM SCH MG: 10 TABLET, FILM COATED ORAL at 20:48

## 2021-02-23 RX ADMIN — RIVAROXABAN SCH MG: 15 TABLET, FILM COATED ORAL at 09:19

## 2021-02-23 RX ADMIN — RIVAROXABAN SCH MG: 15 TABLET, FILM COATED ORAL at 17:48

## 2021-02-23 RX ADMIN — ACETAMINOPHEN PRN MG: 325 TABLET, FILM COATED ORAL at 20:48

## 2021-02-23 RX ADMIN — FLUOXETINE HYDROCHLORIDE SCH MG: 20 CAPSULE ORAL at 09:19

## 2021-02-23 RX ADMIN — QUETIAPINE FUMARATE SCH MG: 100 TABLET ORAL at 20:48

## 2021-02-24 VITALS — DIASTOLIC BLOOD PRESSURE: 75 MMHG | SYSTOLIC BLOOD PRESSURE: 123 MMHG

## 2021-02-24 VITALS — DIASTOLIC BLOOD PRESSURE: 84 MMHG | SYSTOLIC BLOOD PRESSURE: 155 MMHG

## 2021-02-24 VITALS — DIASTOLIC BLOOD PRESSURE: 67 MMHG | SYSTOLIC BLOOD PRESSURE: 119 MMHG

## 2021-02-24 LAB — CREAT SERPL-MCNC: 0.86 MG/DL (ref 0.7–1.3)

## 2021-02-24 RX ADMIN — FLUOXETINE HYDROCHLORIDE SCH MG: 20 CAPSULE ORAL at 08:34

## 2021-02-24 RX ADMIN — RIVAROXABAN SCH MG: 15 TABLET, FILM COATED ORAL at 17:24

## 2021-02-24 RX ADMIN — RIVAROXABAN SCH MG: 15 TABLET, FILM COATED ORAL at 08:34

## 2021-02-24 RX ADMIN — QUETIAPINE FUMARATE SCH MG: 100 TABLET ORAL at 20:34

## 2021-02-24 RX ADMIN — ATORVASTATIN CALCIUM SCH MG: 10 TABLET, FILM COATED ORAL at 20:34

## 2021-02-25 VITALS — SYSTOLIC BLOOD PRESSURE: 122 MMHG | DIASTOLIC BLOOD PRESSURE: 84 MMHG

## 2021-02-25 RX ADMIN — RIVAROXABAN SCH MG: 15 TABLET, FILM COATED ORAL at 08:38

## 2021-02-25 RX ADMIN — FLUOXETINE HYDROCHLORIDE SCH MG: 20 CAPSULE ORAL at 08:38

## 2021-02-26 ENCOUNTER — APPOINTMENT (OUTPATIENT)
Dept: RADIOLOGY | Facility: MEDICAL CENTER | Age: 61
End: 2021-02-26
Attending: EMERGENCY MEDICINE
Payer: MEDICAID

## 2021-02-26 ENCOUNTER — HOSPITAL ENCOUNTER (EMERGENCY)
Facility: MEDICAL CENTER | Age: 61
End: 2021-02-26
Attending: EMERGENCY MEDICINE
Payer: MEDICAID

## 2021-02-26 VITALS
HEART RATE: 78 BPM | WEIGHT: 165.34 LBS | SYSTOLIC BLOOD PRESSURE: 100 MMHG | TEMPERATURE: 99.8 F | RESPIRATION RATE: 16 BRPM | OXYGEN SATURATION: 97 % | HEIGHT: 68 IN | BODY MASS INDEX: 25.06 KG/M2 | DIASTOLIC BLOOD PRESSURE: 74 MMHG

## 2021-02-26 DIAGNOSIS — I26.99 OTHER ACUTE PULMONARY EMBOLISM WITHOUT ACUTE COR PULMONALE (HCC): ICD-10-CM

## 2021-02-26 DIAGNOSIS — R45.89 FEELING OF SADNESS: ICD-10-CM

## 2021-02-26 DIAGNOSIS — R45.851 SUICIDAL THOUGHTS: ICD-10-CM

## 2021-02-26 DIAGNOSIS — R07.9 CHEST PAIN, UNSPECIFIED TYPE: Primary | ICD-10-CM

## 2021-02-26 LAB
ALBUMIN SERPL BCP-MCNC: 4.4 G/DL (ref 3.2–4.9)
ALBUMIN/GLOB SERPL: 1.5 G/DL
ALP SERPL-CCNC: 111 U/L (ref 30–99)
ALT SERPL-CCNC: 26 U/L (ref 2–50)
ANION GAP SERPL CALC-SCNC: 19 MMOL/L (ref 7–16)
AST SERPL-CCNC: 25 U/L (ref 12–45)
BASOPHILS # BLD AUTO: 0.2 % (ref 0–1.8)
BASOPHILS # BLD: 0.03 K/UL (ref 0–0.12)
BILIRUB SERPL-MCNC: 0.4 MG/DL (ref 0.1–1.5)
BUN SERPL-MCNC: 23 MG/DL (ref 8–22)
CALCIUM SERPL-MCNC: 9.8 MG/DL (ref 8.5–10.5)
CHLORIDE SERPL-SCNC: 98 MMOL/L (ref 96–112)
CO2 SERPL-SCNC: 19 MMOL/L (ref 20–33)
CREAT SERPL-MCNC: 0.87 MG/DL (ref 0.5–1.4)
EKG IMPRESSION: NORMAL
EOSINOPHIL # BLD AUTO: 0 K/UL (ref 0–0.51)
EOSINOPHIL NFR BLD: 0 % (ref 0–6.9)
ERYTHROCYTE [DISTWIDTH] IN BLOOD BY AUTOMATED COUNT: 50.5 FL (ref 35.9–50)
ETHANOL BLD-MCNC: <10.1 MG/DL (ref 0–10)
GLOBULIN SER CALC-MCNC: 3 G/DL (ref 1.9–3.5)
GLUCOSE SERPL-MCNC: 132 MG/DL (ref 65–99)
HCT VFR BLD AUTO: 43.3 % (ref 42–52)
HGB BLD-MCNC: 14 G/DL (ref 14–18)
IMM GRANULOCYTES # BLD AUTO: 0.13 K/UL (ref 0–0.11)
IMM GRANULOCYTES NFR BLD AUTO: 0.8 % (ref 0–0.9)
LYMPHOCYTES # BLD AUTO: 1.57 K/UL (ref 1–4.8)
LYMPHOCYTES NFR BLD: 9.3 % (ref 22–41)
MCH RBC QN AUTO: 31 PG (ref 27–33)
MCHC RBC AUTO-ENTMCNC: 32.3 G/DL (ref 33.7–35.3)
MCV RBC AUTO: 95.8 FL (ref 81.4–97.8)
MONOCYTES # BLD AUTO: 0.87 K/UL (ref 0–0.85)
MONOCYTES NFR BLD AUTO: 5.2 % (ref 0–13.4)
NEUTROPHILS # BLD AUTO: 14.23 K/UL (ref 1.82–7.42)
NEUTROPHILS NFR BLD: 84.5 % (ref 44–72)
NRBC # BLD AUTO: 0 K/UL
NRBC BLD-RTO: 0 /100 WBC
PLATELET # BLD AUTO: 400 K/UL (ref 164–446)
PMV BLD AUTO: 9.9 FL (ref 9–12.9)
POC BREATHALIZER: 0 PERCENT (ref 0–0.01)
POTASSIUM SERPL-SCNC: 3.8 MMOL/L (ref 3.6–5.5)
PROT SERPL-MCNC: 7.4 G/DL (ref 6–8.2)
RBC # BLD AUTO: 4.52 M/UL (ref 4.7–6.1)
SODIUM SERPL-SCNC: 136 MMOL/L (ref 135–145)
TROPONIN T SERPL-MCNC: 17 NG/L (ref 6–19)
TROPONIN T SERPL-MCNC: 18 NG/L (ref 6–19)
WBC # BLD AUTO: 16.8 K/UL (ref 4.8–10.8)

## 2021-02-26 PROCEDURE — A9270 NON-COVERED ITEM OR SERVICE: HCPCS | Performed by: EMERGENCY MEDICINE

## 2021-02-26 PROCEDURE — 82077 ASSAY SPEC XCP UR&BREATH IA: CPT

## 2021-02-26 PROCEDURE — 93005 ELECTROCARDIOGRAM TRACING: CPT | Performed by: EMERGENCY MEDICINE

## 2021-02-26 PROCEDURE — 99285 EMERGENCY DEPT VISIT HI MDM: CPT

## 2021-02-26 PROCEDURE — 84484 ASSAY OF TROPONIN QUANT: CPT | Mod: 91

## 2021-02-26 PROCEDURE — 36415 COLL VENOUS BLD VENIPUNCTURE: CPT

## 2021-02-26 PROCEDURE — 700102 HCHG RX REV CODE 250 W/ 637 OVERRIDE(OP): Performed by: EMERGENCY MEDICINE

## 2021-02-26 PROCEDURE — 85025 COMPLETE CBC W/AUTO DIFF WBC: CPT

## 2021-02-26 PROCEDURE — 90791 PSYCH DIAGNOSTIC EVALUATION: CPT

## 2021-02-26 PROCEDURE — 93005 ELECTROCARDIOGRAM TRACING: CPT

## 2021-02-26 PROCEDURE — 80053 COMPREHEN METABOLIC PANEL: CPT

## 2021-02-26 PROCEDURE — 71045 X-RAY EXAM CHEST 1 VIEW: CPT

## 2021-02-26 PROCEDURE — 302970 POC BREATHALIZER: Performed by: EMERGENCY MEDICINE

## 2021-02-26 RX ORDER — QUETIAPINE FUMARATE 100 MG/1
100 TABLET, FILM COATED ORAL ONCE
Status: COMPLETED | OUTPATIENT
Start: 2021-02-26 | End: 2021-02-26

## 2021-02-26 RX ORDER — ACETAMINOPHEN 500 MG
1000 TABLET ORAL
COMMUNITY

## 2021-02-26 RX ORDER — QUETIAPINE FUMARATE 400 MG/1
400 TABLET, FILM COATED ORAL
COMMUNITY

## 2021-02-26 RX ORDER — FLUOXETINE HYDROCHLORIDE 20 MG/1
20 CAPSULE ORAL DAILY
COMMUNITY

## 2021-02-26 RX ORDER — NITROGLYCERIN 0.4 MG/1
0.4 TABLET SUBLINGUAL
COMMUNITY

## 2021-02-26 RX ADMIN — RIVAROXABAN 15 MG: 15 TABLET, FILM COATED ORAL at 04:22

## 2021-02-26 RX ADMIN — QUETIAPINE 100 MG: 100 TABLET, FILM COATED ORAL at 06:07

## 2021-02-26 ASSESSMENT — ENCOUNTER SYMPTOMS
NAUSEA: 0
SORE THROAT: 0
FEVER: 0
COUGH: 0
VOMITING: 0
DIZZINESS: 0
WEAKNESS: 0
CHILLS: 0
NECK PAIN: 0
ORTHOPNEA: 0
PALPITATIONS: 0
BACK PAIN: 0
HEADACHES: 0
SHORTNESS OF BREATH: 0
NERVOUS/ANXIOUS: 0
ABDOMINAL PAIN: 0

## 2021-02-26 ASSESSMENT — HEART SCORE
RISK FACTORS: >2 RISK FACTORS OR HX OF ATHEROSCLEROTIC DISEASE
AGE: 45-64
TROPONIN: LESS THAN OR EQUAL TO NORMAL LIMIT
HEART SCORE: 3
ECG: NORMAL
HISTORY: SLIGHTLY SUSPICIOUS

## 2021-02-26 ASSESSMENT — FIBROSIS 4 INDEX: FIB4 SCORE: 1.73

## 2021-02-26 ASSESSMENT — PAIN DESCRIPTION - DESCRIPTORS: DESCRIPTORS: SHARP;PRESSURE

## 2021-02-26 NOTE — ED NOTES
Evaluation done. Patient placed on legal hold by alert team and will have psychiatry evaluation this morning.

## 2021-02-26 NOTE — ED NOTES
Report received, pt moved from [address] to [address], all belongings removed and placed in a bag. Security called to check belongings.

## 2021-02-26 NOTE — ED PROVIDER NOTES
ED Provider Addendum Note    Scribed for Lorenzo Potts M.D. by Jason Corriea. 2/26/2021  8:53 AM    This is an addendum to the note on this patient.  For further details and full chart information, see the previously signed note.      8:53 AM - Upon evaluation, the patient is sleeping in bed. But easily aroused to auditory stimuli. He requests water at this time and denies any new complaints.  He will also like some to eat.  His vital signs are quite normal.  He does not have any new complaints at this time     IJason (Scribe), am scribing for, and in the presence of, Lorenzo Potts M.D..    Electronically signed by: Jason Correia ([employer]), 2/26/2021    Lorenzo SALEEM M.D. personally performed the services described in this documentation, as scribed by Jason Correia in my presence, and it is both accurate and complete.    The note accurately reflects work and decisions made by me.  Lorenzo Potts M.D.  2/26/2021  11:47 AM

## 2021-02-26 NOTE — ED PROVIDER NOTES
ED Provider Note     2/26/2021  2:15 AM    Means of Arrival: EMS  History obtained by: patient  Limitations: none  PCP: none listed  CODE STATUS: full    CHIEF COMPLAINT  Chief Complaint   Patient presents with   • Chest Pain     brought in by beresa from picked up from [facility] c/o chest pain x 1 hr. midsternal pain described as tightness / sharp. had 3 beers tonight. states that he ran out of blood thinner and nitro. aspirin 324 mg given PTA       HPI  Ravi Tripathi is a 60 y.o. male with history of angina, panic attacks, high cholesterol, diagnosed with a pulmonary embolism within the past month who presents with concerns of central chest pain. No shortness of breath. Pain [place] 1 hour prior [place] He has drank some alcohol but is not intoxicated. He is given aspirin and nitro prior to arrival. Pain is nonradiating, sharp, feels similar to prior episodes of chest pain.    He ran out of Xarelto 2 days ago. He says he was taking it daily prior to then. Needs a refill prescription.    REVIEW OF SYSTEMS  Review of Systems   Constitutional: Negative for chills and fever.   HENT: Negative for congestion and sore throat.    Respiratory: Negative for cough and shortness of breath.    Cardiovascular: Positive for chest pain. Negative for palpitations and orthopnea.   Gastrointestinal: Negative for abdominal pain, nausea and vomiting.   Genitourinary: Negative for dysuria.   Musculoskeletal: Negative for back pain and neck pain.   Skin: Negative for rash.   Neurological: Negative for dizziness, weakness and headaches.   Psychiatric/Behavioral: The patient is not nervous/anxious.    All other systems reviewed and are negative.    See HPI for further details.    PAST MEDICAL HISTORY   has a past medical history of High cholesterol, History of angina, Panic attacks, PTSD (post-traumatic stress disorder), and Schizophrenia ([facility]).    SOCIAL HISTORY  Social History     Tobacco Use   •  "Smoking status: Current Every Day Smoker   • Smokeless tobacco: Never Used   Substance and Sexual Activity   • Alcohol use: Not Currently   • Drug use: Yes     Types: Inhaled     Comment: marijuana currently and previous hx of meth (last use 3 weeks ago as of 2/11/2021)   • Sexual activity: Not on file       SURGICAL HISTORY   has a past surgical history that includes tonsillectomy.    CURRENT MEDICATIONS  Home Medications     Reviewed by Alexis Harrison (Pharmacy Tech) on 02/26/21 at 0707  Med List Status: Complete   Medication Last Dose Status   acetaminophen (TYLENOL) 500 MG Tab 2/25/2021 Active   aspirin 81 MG EC tablet 2/25/2021 Active   atorvastatin (LIPITOR) 80 MG tablet >7 days ago Active   Cephalexin (KEFLEX PO) 2/24/2021 Active   FLUoxetine (PROZAC) 20 MG Cap 2/25/2021 Active   metoprolol tartrate (LOPRESSOR) 25 MG Tab Not Taking Active   nitroglycerin (NITROSTAT) 0.4 MG SL Tab >7 days ago Active   quetiapine (SEROQUEL) 400 MG tablet 2/25/2021 Active   rivaroxaban (XARELTO) 20 MG Tab tablet >2 days ago Active                ALLERGIES  Allergies   Allergen Reactions   • Fish Allergy Hives       PHYSICAL EXAM  VITAL SIGNS: /88   Pulse 99   Temp 36.8 °C (98.3 °F) (Temporal)   Resp 17   Ht 1.727 m (5' 8\")   Wt 75 kg (165 lb 5.5 oz)   SpO2 94%   BMI 25.14 kg/m²     Constitutional: Alert in no apparent distress. Pleasant 60-year-old man.  HENT: No signs of trauma, Bilateral external ears normal, Nose normal.   Eyes: Pupils are equal, Conjunctiva normal, Non-icteric.   Neck: Normal range of motion, No tenderness, Supple, No stridor. No JVD  Cardiovascular: Regular rate and rhythm, no murmurs. Symmetric distal pulses. No cyanosis of extremities. No peripheral edema of extremities.  Thorax & Lungs: Normal breath sounds, No respiratory distress, No wheezing, No chest tenderness.   Abdomen: Soft, No tenderness, No masses, No pulsatile masses. No peritoneal signs.  Skin: Warm, Dry, No erythema, No " rash.   Back: No midline bony tenderness, No CVA tenderness.   Musculoskeletal: Good range of motion in all major joints. No tenderness to palpation or major deformities noted.   Neurologic: Alert , Normal motor function, Normal sensory function, No focal deficits noted.   Psychiatric: Calm (see update below)  Physical Exam      DIAGNOSTIC STUDIES / PROCEDURES    EKG  Results for orders placed or performed during the hospital encounter of 21   EKG   Result Value Ref Range    Report       [facility] Emergency Dept.    Test Date:  2021  Pt Name:    JA BROWER             Department: ER  MRN:        [id number]                      Room:       RD 10  Gender:     Male                         Technician: LISA  :        1960                   Requested By:ER TRIAGE PROTOCOL  Order #:    [id number]                    Reading MD: Smith Loya II, MD    Measurements  Intervals                                Axis  Rate:       92                           P:          73  WV:         138                          QRS:        108  QRSD:       101                          T:          24  QT:         366  QTc:        453    Interpretive Statements  Sinus rhythm  Right axis deviation  Normal rate 92  Normal intervals.  No ST elevation or depression.  Impression: Normal sinus rhythm EKG  Compared to ECG 02/10/2021 04:46:14  Right-axis deviation now present  Electronically Signed On 2021 2:13:46 PST by Smith Loya II, MD           LABS  Pertinent Labs & Imaging studies reviewed. (See chart for details)    RADIOLOGY  Pertinent Labs & Imaging studies reviewed. (See chart for details)    COURSE & MEDICAL DECISION MAKING  Pertinent Labs & Imaging studies reviewed. (See chart for details)    2:15 AM This is an emergent evaluation of a  60 y.o. male who presents with central chest pain starting 1 hour ago while at the [facility]. Physical exam significant for well-appearing 60-year-old  man.  The considered diagnosis include but not limited to emergent causes of chest pain such as MI, PE, pneumothorax, dissection, pneumonia, pericarditis. Also considering less emergent causes such as musculoskeletal pain, radiculopathy, costochondritis.  Ordered EKG, CXR, cbc, cmp, troponin to evaluate. Patient has already been treated with nitroglycerin and aspirin prior to arrival.    I have ordered continuous pulse ox and cardiac monitoring. Pulse ox shows a normal wave form with normal saturations >94%. Cardiac monitors show a normal sinus rhythm without ectopy.       HEART SCORE=3  PERC does not qualify for PERC rule out given age, known PE.    4:30 AM  Labs similar to previous labs. He has slight elevation in white count but is not demonstrating any infectious symptoms. His chest x-ray does not reveal any pneumonia. He has no cough. He has no abdominal pain or tenderness. No pain with urination. Metabolic panel similar to previous as well. He has slightly low bicarb that I do not have explanation for. BUN similar to previous. Alcohol level negative. Initial troponin 18. Repeating troponin now. He will be given his dose of Xarelto for the day now. Plan to provide him with a prescription for Xarelto.    5:59 AM  7 troponin negative.  No longer saying he has chest pain.  He is now telling us that he is hearing voices voices are telling him to go into traffic on [address].  He has no recent suicide attempts.  He says he is sad because he has been homeless for 18 years and never had a girlfriend.  Alert team will come and speak with him.  I am not placing him on a hold at this time.  He has been seen in our ER multiple times although this record only mentions a couple recent visits.  There is another chart.    6:43 AM  Alert team, Diamond has met with them.  They have concerns about his suicidal thoughts.  He has high risk given he is older age, homeless, feeling helpless.  She has initiated legal hold.  He is  medically cleared and I have certified to hold.  Prescription for Xarelto has been sent to his pharmacy.  We will also print a paper prescription in case he needs this for facility.    FINAL IMPRESSION    ICD-10-CM   1. Chest pain, unspecified type  R07.9   2. Feeling of sadness  R45.89   3. Suicidal thoughts  R45.851   4. Other acute pulmonary embolism without acute cor pulmonale (HCC)  I26.99          This dictation was created using voice recognition software. The accuracy of the dictation is limited to the abilities of the software. I expect there may be some errors of grammar and possibly content. The nursing notes were reviewed and certain aspects of this information were incorporated into this note.    Electronically signed by: Smith Loya II, M.D., 2/26/2021 2:15 AM

## 2021-02-26 NOTE — ED NOTES
2ns troponin resulted. Chart up for MD to re-eval. Patient states he is now hearing voices telling him to smash his head on the wall. MD aware.
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
## 2021-02-26 NOTE — DISCHARGE PLANNING
Addendum 2/26/21  0808    Lsw did not send the Urine Drug screen with this packet due to the results are pending .    Patient is on a legal hold. This Lsw faxed over the behavioral health fax referral packet to  and [employer]. Information showing that the item was faxed was placed on the Legal holding track board by this Lsw.      Addendum: 2/26/21  0856    Pt was accepted by [facility]. Transport time has been scheduled for 1200 via [facility].     [facility], 1st and 2nd page of the L2k and the Facesheet.    Lsw contacted Alex with [facility] ([phone]) to seek approval for [facility] to transport.Lsw spoke with Alex who reported that the patient is not eligible for transportation services due to his insurance does not cover transportation.    Lsw contacted [facility] ([phone] Option 2) to schedule transportation. Lsw spoke with Laura who stated that they will schedule his pickup time at 1200.    Lsw placed transport packet in the patient's medical chart for [facility].

## 2021-02-26 NOTE — ED NOTES
Given breakfast tray, pt said his stomach hurts and doesn't want to eat right now. Sitter outside the room.

## 2021-02-26 NOTE — ED TRIAGE NOTES
Ravi Tripathi  60 y.o.  male  Chief Complaint   Patient presents with   • Chest Pain     brought in by jordan from picked up from [facility] c/o chest pain x 1 hr. midsternal pain described as tightness / sharp. had 3 beers tonight. states that he ran out of blood thinner and nitro. aspirin 324 mg given PTA     Assessment made. Chart up for MD to see.

## 2021-10-15 ENCOUNTER — EMERGENCY (EMERGENCY)
Facility: HOSPITAL | Age: 61
LOS: 1 days | Discharge: LEFT BEFORE DISCHARGE | End: 2021-10-15
Attending: EMERGENCY MEDICINE | Admitting: EMERGENCY MEDICINE
Payer: COMMERCIAL

## 2021-10-15 VITALS
HEART RATE: 74 BPM | OXYGEN SATURATION: 96 % | TEMPERATURE: 98 F | RESPIRATION RATE: 20 BRPM | HEIGHT: 68 IN | SYSTOLIC BLOOD PRESSURE: 136 MMHG | DIASTOLIC BLOOD PRESSURE: 70 MMHG | WEIGHT: 182.1 LBS

## 2021-10-15 DIAGNOSIS — R06.02 SHORTNESS OF BREATH: ICD-10-CM

## 2021-10-15 DIAGNOSIS — R06.2 WHEEZING: ICD-10-CM

## 2021-10-15 DIAGNOSIS — I10 ESSENTIAL (PRIMARY) HYPERTENSION: ICD-10-CM

## 2021-10-15 DIAGNOSIS — R07.89 OTHER CHEST PAIN: ICD-10-CM

## 2021-10-15 DIAGNOSIS — R05.3 CHRONIC COUGH: ICD-10-CM

## 2021-10-15 DIAGNOSIS — Z20.822 CONTACT WITH AND (SUSPECTED) EXPOSURE TO COVID-19: ICD-10-CM

## 2021-10-15 DIAGNOSIS — F17.200 NICOTINE DEPENDENCE, UNSPECIFIED, UNCOMPLICATED: ICD-10-CM

## 2021-10-15 DIAGNOSIS — J44.9 CHRONIC OBSTRUCTIVE PULMONARY DISEASE, UNSPECIFIED: ICD-10-CM

## 2021-10-15 LAB
ALBUMIN SERPL ELPH-MCNC: 4 G/DL — SIGNIFICANT CHANGE UP (ref 3.3–5)
ALP SERPL-CCNC: SIGNIFICANT CHANGE UP U/L (ref 40–120)
ALT FLD-CCNC: SIGNIFICANT CHANGE UP U/L (ref 10–45)
ANION GAP SERPL CALC-SCNC: 9 MMOL/L — SIGNIFICANT CHANGE UP (ref 5–17)
AST SERPL-CCNC: SIGNIFICANT CHANGE UP U/L (ref 10–40)
BASOPHILS # BLD AUTO: 0.03 K/UL — SIGNIFICANT CHANGE UP (ref 0–0.2)
BASOPHILS NFR BLD AUTO: 0.4 % — SIGNIFICANT CHANGE UP (ref 0–2)
BILIRUB SERPL-MCNC: 0.3 MG/DL — SIGNIFICANT CHANGE UP (ref 0.2–1.2)
BUN SERPL-MCNC: 10 MG/DL — SIGNIFICANT CHANGE UP (ref 7–23)
CALCIUM SERPL-MCNC: 9.4 MG/DL — SIGNIFICANT CHANGE UP (ref 8.4–10.5)
CHLORIDE SERPL-SCNC: 104 MMOL/L — SIGNIFICANT CHANGE UP (ref 96–108)
CK MB CFR SERPL CALC: 2.6 NG/ML — SIGNIFICANT CHANGE UP (ref 0–6.7)
CK SERPL-CCNC: SIGNIFICANT CHANGE UP U/L (ref 30–200)
CO2 SERPL-SCNC: 24 MMOL/L — SIGNIFICANT CHANGE UP (ref 22–31)
CREAT SERPL-MCNC: 0.76 MG/DL — SIGNIFICANT CHANGE UP (ref 0.5–1.3)
EOSINOPHIL # BLD AUTO: 0.02 K/UL — SIGNIFICANT CHANGE UP (ref 0–0.5)
EOSINOPHIL NFR BLD AUTO: 0.3 % — SIGNIFICANT CHANGE UP (ref 0–6)
GLUCOSE SERPL-MCNC: 111 MG/DL — HIGH (ref 70–99)
HCT VFR BLD CALC: 48.4 % — SIGNIFICANT CHANGE UP (ref 39–50)
HGB BLD-MCNC: 16 G/DL — SIGNIFICANT CHANGE UP (ref 13–17)
IMM GRANULOCYTES NFR BLD AUTO: 0.3 % — SIGNIFICANT CHANGE UP (ref 0–1.5)
LYMPHOCYTES # BLD AUTO: 1.24 K/UL — SIGNIFICANT CHANGE UP (ref 1–3.3)
LYMPHOCYTES # BLD AUTO: 16.2 % — SIGNIFICANT CHANGE UP (ref 13–44)
MCHC RBC-ENTMCNC: 32.1 PG — SIGNIFICANT CHANGE UP (ref 27–34)
MCHC RBC-ENTMCNC: 33.1 GM/DL — SIGNIFICANT CHANGE UP (ref 32–36)
MCV RBC AUTO: 97.2 FL — SIGNIFICANT CHANGE UP (ref 80–100)
MONOCYTES # BLD AUTO: 0.52 K/UL — SIGNIFICANT CHANGE UP (ref 0–0.9)
MONOCYTES NFR BLD AUTO: 6.8 % — SIGNIFICANT CHANGE UP (ref 2–14)
NEUTROPHILS # BLD AUTO: 5.81 K/UL — SIGNIFICANT CHANGE UP (ref 1.8–7.4)
NEUTROPHILS NFR BLD AUTO: 76 % — SIGNIFICANT CHANGE UP (ref 43–77)
NRBC # BLD: 0 /100 WBCS — SIGNIFICANT CHANGE UP (ref 0–0)
PLATELET # BLD AUTO: 353 K/UL — SIGNIFICANT CHANGE UP (ref 150–400)
POTASSIUM SERPL-MCNC: SIGNIFICANT CHANGE UP MMOL/L (ref 3.5–5.3)
POTASSIUM SERPL-SCNC: SIGNIFICANT CHANGE UP MMOL/L (ref 3.5–5.3)
PROT SERPL-MCNC: 7 G/DL — SIGNIFICANT CHANGE UP (ref 6–8.3)
RBC # BLD: 4.98 M/UL — SIGNIFICANT CHANGE UP (ref 4.2–5.8)
RBC # FLD: 14.2 % — SIGNIFICANT CHANGE UP (ref 10.3–14.5)
SARS-COV-2 RNA SPEC QL NAA+PROBE: NEGATIVE — SIGNIFICANT CHANGE UP
SODIUM SERPL-SCNC: 137 MMOL/L — SIGNIFICANT CHANGE UP (ref 135–145)
TROPONIN T SERPL-MCNC: <0.01 NG/ML — SIGNIFICANT CHANGE UP (ref 0–0.01)
WBC # BLD: 7.64 K/UL — SIGNIFICANT CHANGE UP (ref 3.8–10.5)
WBC # FLD AUTO: 7.64 K/UL — SIGNIFICANT CHANGE UP (ref 3.8–10.5)

## 2021-10-15 PROCEDURE — 93010 ELECTROCARDIOGRAM REPORT: CPT

## 2021-10-15 PROCEDURE — 94640 AIRWAY INHALATION TREATMENT: CPT

## 2021-10-15 PROCEDURE — 87635 SARS-COV-2 COVID-19 AMP PRB: CPT

## 2021-10-15 PROCEDURE — 85025 COMPLETE CBC W/AUTO DIFF WBC: CPT

## 2021-10-15 PROCEDURE — 82550 ASSAY OF CK (CPK): CPT

## 2021-10-15 PROCEDURE — 36415 COLL VENOUS BLD VENIPUNCTURE: CPT

## 2021-10-15 PROCEDURE — 80053 COMPREHEN METABOLIC PANEL: CPT

## 2021-10-15 PROCEDURE — 93005 ELECTROCARDIOGRAM TRACING: CPT

## 2021-10-15 PROCEDURE — 82553 CREATINE MB FRACTION: CPT

## 2021-10-15 PROCEDURE — 99285 EMERGENCY DEPT VISIT HI MDM: CPT

## 2021-10-15 PROCEDURE — 99284 EMERGENCY DEPT VISIT MOD MDM: CPT | Mod: 25

## 2021-10-15 PROCEDURE — 84484 ASSAY OF TROPONIN QUANT: CPT

## 2021-10-15 RX ORDER — ALBUTEROL 90 UG/1
2.5 AEROSOL, METERED ORAL
Refills: 0 | Status: DISCONTINUED | OUTPATIENT
Start: 2021-10-15 | End: 2021-10-18

## 2021-10-15 RX ADMIN — ALBUTEROL 2.5 MILLIGRAM(S): 90 AEROSOL, METERED ORAL at 09:26

## 2021-10-15 RX ADMIN — ALBUTEROL 2.5 MILLIGRAM(S): 90 AEROSOL, METERED ORAL at 10:00

## 2021-10-15 RX ADMIN — Medication 50 MILLIGRAM(S): at 09:25

## 2021-10-15 NOTE — ED ADULT TRIAGE NOTE - CHIEF COMPLAINT QUOTE
61 y/o male with hx of COPD here for SOB, ran out of medications, EMS administered two duo neb, pt coughing, afebrile.

## 2021-10-15 NOTE — ED ADULT NURSE NOTE - CHIEF COMPLAINT QUOTE
59 y/o male with hx of COPD here for SOB, ran out of medications, EMS administered two duo neb, pt coughing, afebrile.

## 2021-10-15 NOTE — ED PROVIDER NOTE - OBJECTIVE STATEMENT
61yo male with pmhx of HTN, angina, COPD, mental health disorder presents with cough and chest tightness. Pt reports chronic cough, states he has had chest tightness and wheezing for 1hr 45min this AM. He states he has lost all his home medications. He denies fever, chills, shortness of breath, abdominal pain, nausea/vomiting, diarrhea, urinary symptoms. He was given two duoneb treatments en route by EMS. He is vaccinated for COVID.

## 2021-10-15 NOTE — ED PROVIDER NOTE - PHYSICAL EXAMINATION
VITAL SIGNS: I have reviewed nursing notes and confirm.  CONSTITUTIONAL: Well-developed; in no acute distress.   SKIN:  warm and dry, no acute rash.   HEAD:  normocephalic, atraumatic.  EYES: PERRL, EOM intact; conjunctiva and sclera clear.  ENT: No nasal discharge; airway clear.   NECK: Supple; non tender.  CARD: S1, S2 normal; no murmurs, gallops, or rubs. Regular rate and rhythm.   RESP:  No rales or rhonchi. No increased respiratory effort or accessory muscle usage. He has expiratory wheezing in all lung fields.   ABD: Normal bowel sounds; soft; non-distended; non-tender; no guarding/ rebound.  EXT: Normal ROM. No clubbing, cyanosis or edema. 2+ pulses to b/l ue/le.  NEURO: Alert, oriented, grossly unremarkable  PSYCH: Cooperative, mood and affect appropriate.

## 2021-10-15 NOTE — ED PROVIDER NOTE - ATTENDING CONTRIBUTION TO CARE
61 yo M with PMH of HTN, angina, COPD, mental health ds presents with cough and chest tightness. Reports chronic cough, states he has had chest tightness and wheezing for 1hr 45min this AM. He states he has lost all his home medications. Denies fever, chills, shortness of breath, abdominal pain, nausea/vomiting, diarrhea, urinary symptoms. He was given two duoneb treatments en route by EMS. He is vaccinated for COVID. Pt afebrile and hemodynamically stable. VSS. He has expiratory wheezing on exam without increased respiratory effort or accessory muscle usage. Was given albuterol neb tx in ED and prednisone 50mg po. ECG without evidence of acute ischemic changes or arrythmia. Troponin neg. CBC unremarkable. Pt eloped prior chest xray and return of rest of labs.

## 2021-10-15 NOTE — ED PROVIDER NOTE - CLINICAL SUMMARY MEDICAL DECISION MAKING FREE TEXT BOX
pt afebrile and hemodynamically stable. He has expiratory wheezing on exam without increased respiratory effort or accessory muscle usage. he was given albuterol neb tx in ED and prednisone 50mg po. ECG without evidence of acute ischemic changes or arrythmia. Troponin neg. CBC unremarkable. Pt eloped prior chest xray and return of rest of labs. Of  note, pt out of psychiatric medications; however, denies SI or HI to me today. States he will follow up with a psychiatrist for refill. Pt eloped from ED, stated to nurse he wanted to return to shelter.

## 2021-10-15 NOTE — ED PROVIDER NOTE - NS ED ROS FT
Constitutional: No fever. No chills.  Eyes: No redness. No discharge. No vision change.   ENT: No sore throat. No ear pain.  Cardiovascular: No chest pain. No leg swelling.  Respiratory: +cough. +wheezing. No shortness of breath.  GI: No abdominal pain. No vomiting. No diarrhea.   MSK: No joint pain. No back pain.   Skin: No rash. No abrasions.   Neuro: No numbness. No weakness.   Psych: No known mental health issues.

## 2021-10-15 NOTE — ED ADULT NURSE NOTE - OBJECTIVE STATEMENT
PT is a 59yo male with HX:copd brought in by EMS C/O CP and SOB. PT Given Nebulizer treatment in route. PT is ambulating with steady gait, speaking in clear complete sentences with any JAME noted.

## 2023-04-07 ENCOUNTER — HOSPITAL ENCOUNTER (INPATIENT)
Dept: HOSPITAL 27 - EMS | Age: 63
LOS: 7 days | Discharge: HOME | DRG: 750 | End: 2023-04-14
Attending: PSYCHIATRIC HOSPITAL | Admitting: PSYCHIATRIC HOSPITAL
Payer: MEDICAID

## 2023-04-07 VITALS — SYSTOLIC BLOOD PRESSURE: 124 MMHG | DIASTOLIC BLOOD PRESSURE: 82 MMHG

## 2023-04-07 VITALS — HEIGHT: 68 IN | WEIGHT: 188.72 LBS | BODY MASS INDEX: 28.6 KG/M2

## 2023-04-07 DIAGNOSIS — F25.1: Primary | ICD-10-CM

## 2023-04-07 DIAGNOSIS — I20.9: ICD-10-CM

## 2023-04-07 DIAGNOSIS — Z20.822: ICD-10-CM

## 2023-04-07 DIAGNOSIS — I10: ICD-10-CM

## 2023-04-07 DIAGNOSIS — F43.12: ICD-10-CM

## 2023-04-07 DIAGNOSIS — Z91.51: ICD-10-CM

## 2023-04-07 DIAGNOSIS — G47.00: ICD-10-CM

## 2023-04-07 DIAGNOSIS — R45.851: ICD-10-CM

## 2023-04-07 DIAGNOSIS — Z91.013: ICD-10-CM

## 2023-04-07 DIAGNOSIS — Z79.899: ICD-10-CM

## 2023-04-07 DIAGNOSIS — Z91.411: ICD-10-CM

## 2023-04-07 DIAGNOSIS — J44.9: ICD-10-CM

## 2023-04-07 DIAGNOSIS — K50.90: ICD-10-CM

## 2023-04-07 LAB
ALBUMIN SERPL-MCNC: 4.1 G/DL (ref 3.4–5)
ALP SERPL-CCNC: 116 U/L (ref 46–116)
ALT SERPL-CCNC: 27 U/L (ref 12–78)
ANION GAP SERPL CALCULATED.3IONS-SCNC: 8 MMOL/L (ref 8–16)
AST SERPL-CCNC: 22 U/L (ref 15–37)
BASOPHILS # BLD AUTO: 0 K/UL (ref 0–0.2)
BASOPHILS NFR BLD AUTO: 0.4 % (ref 0–2)
BILIRUB SERPL-MCNC: 0.6 MG/DL (ref 0.1–1)
BUN SERPL-MCNC: 11 MG/DL (ref 7–18)
CALCIUM SERPL-MCNC: 9.5 MG/DL (ref 8.8–10.5)
CHLORIDE SERPL-SCNC: 104 MMOL/L (ref 98–107)
CO2 SERPL-SCNC: 29 MMOL/L (ref 22–29)
CREAT SERPL-MCNC: 0.85 MG/DL (ref 0.6–1.3)
EOSINOPHIL # BLD AUTO: 0 K/UL (ref 0–0.7)
EOSINOPHIL NFR BLD AUTO: 0.5 % (ref 1–6)
ERYTHROCYTE [DISTWIDTH] IN BLOOD BY AUTOMATED COUNT: 13.9 % (ref 11.5–14.5)
GFR SERPL CREATININE-BSD FRML MDRD: > 60 ML/MIN (ref 60–?)
GLUCOSE SERPL-MCNC: 102 MG/DL (ref 70–110)
HCT VFR BLD AUTO: 43.7 % (ref 41–53)
HGB BLD-MCNC: 14.8 G/DL (ref 13.5–17.5)
LYMPHOCYTES # BLD AUTO: 2.3 K/UL (ref 1–4.8)
LYMPHOCYTES NFR BLD AUTO: 29.8 % (ref 22–44)
MCH RBC QN AUTO: 32.4 PG (ref 26–34)
MCHC RBC AUTO-ENTMCNC: 33.9 G/DL (ref 31–37)
MCV RBC AUTO: 96 FL (ref 80–100)
MONOCYTES # BLD AUTO: 0.6 K/UL (ref 0.1–1)
MONOCYTES NFR BLD AUTO: 7.8 % (ref 2–9)
NEUTROPHILS # BLD AUTO: 4.8 K/UL (ref 1.8–7.7)
NEUTROPHILS NFR BLD AUTO: 61.5 % (ref 40–70)
PLATELET # BLD AUTO: 289 K/UL (ref 150–450)
POTASSIUM SERPL-SCNC: 3.9 MMOL/L (ref 3.5–5.1)
PROT SERPL-MCNC: 8.3 G/DL (ref 6.4–8.2)
RBC # BLD AUTO: 4.58 MIL/UL (ref 4.5–5.9)
SARS-COV-2 AG RESP QL IA.RAPID: (no result)
SODIUM SERPL-SCNC: 141 MMOL/L (ref 136–145)

## 2023-04-07 PROCEDURE — 99285 EMERGENCY DEPT VISIT HI MDM: CPT

## 2023-04-07 PROCEDURE — 80053 COMPREHEN METABOLIC PANEL: CPT

## 2023-04-07 PROCEDURE — 85025 COMPLETE CBC W/AUTO DIFF WBC: CPT

## 2023-04-08 VITALS — DIASTOLIC BLOOD PRESSURE: 61 MMHG | SYSTOLIC BLOOD PRESSURE: 107 MMHG

## 2023-04-08 VITALS — SYSTOLIC BLOOD PRESSURE: 111 MMHG | DIASTOLIC BLOOD PRESSURE: 71 MMHG

## 2023-04-08 RX ADMIN — ZOLPIDEM TARTRATE PRN MG: 10 TABLET ORAL at 02:19

## 2023-04-08 RX ADMIN — Medication SCH MG: at 20:01

## 2023-04-08 RX ADMIN — SERTRALINE HYDROCHLORIDE SCH MG: 100 TABLET ORAL at 13:09

## 2023-04-09 VITALS — DIASTOLIC BLOOD PRESSURE: 77 MMHG | SYSTOLIC BLOOD PRESSURE: 137 MMHG

## 2023-04-09 VITALS — SYSTOLIC BLOOD PRESSURE: 146 MMHG | DIASTOLIC BLOOD PRESSURE: 84 MMHG

## 2023-04-09 RX ADMIN — Medication SCH MG: at 20:15

## 2023-04-09 RX ADMIN — ZOLPIDEM TARTRATE PRN MG: 10 TABLET ORAL at 20:15

## 2023-04-09 RX ADMIN — SERTRALINE HYDROCHLORIDE SCH MG: 100 TABLET ORAL at 08:34

## 2023-04-10 VITALS — SYSTOLIC BLOOD PRESSURE: 116 MMHG | DIASTOLIC BLOOD PRESSURE: 68 MMHG

## 2023-04-10 VITALS — DIASTOLIC BLOOD PRESSURE: 71 MMHG | SYSTOLIC BLOOD PRESSURE: 117 MMHG

## 2023-04-10 VITALS — DIASTOLIC BLOOD PRESSURE: 57 MMHG | SYSTOLIC BLOOD PRESSURE: 120 MMHG

## 2023-04-10 RX ADMIN — SERTRALINE HYDROCHLORIDE SCH MG: 100 TABLET ORAL at 08:23

## 2023-04-10 RX ADMIN — ZOLPIDEM TARTRATE PRN MG: 10 TABLET ORAL at 20:22

## 2023-04-10 RX ADMIN — Medication SCH MG: at 20:22

## 2023-04-10 RX ADMIN — APIXABAN SCH MG: 2.5 TABLET, FILM COATED ORAL at 19:21

## 2023-04-11 VITALS — SYSTOLIC BLOOD PRESSURE: 108 MMHG | DIASTOLIC BLOOD PRESSURE: 68 MMHG

## 2023-04-11 VITALS — DIASTOLIC BLOOD PRESSURE: 84 MMHG | SYSTOLIC BLOOD PRESSURE: 135 MMHG

## 2023-04-11 VITALS — DIASTOLIC BLOOD PRESSURE: 73 MMHG | SYSTOLIC BLOOD PRESSURE: 113 MMHG

## 2023-04-11 RX ADMIN — Medication SCH MG: at 20:29

## 2023-04-11 RX ADMIN — APIXABAN SCH MG: 2.5 TABLET, FILM COATED ORAL at 16:39

## 2023-04-11 RX ADMIN — APIXABAN SCH MG: 2.5 TABLET, FILM COATED ORAL at 08:06

## 2023-04-11 RX ADMIN — SERTRALINE HYDROCHLORIDE SCH MG: 100 TABLET ORAL at 08:07

## 2023-04-11 RX ADMIN — ZOLPIDEM TARTRATE PRN MG: 10 TABLET ORAL at 20:28

## 2023-04-12 VITALS — SYSTOLIC BLOOD PRESSURE: 105 MMHG | DIASTOLIC BLOOD PRESSURE: 62 MMHG

## 2023-04-12 VITALS — DIASTOLIC BLOOD PRESSURE: 81 MMHG | SYSTOLIC BLOOD PRESSURE: 130 MMHG

## 2023-04-12 VITALS — DIASTOLIC BLOOD PRESSURE: 79 MMHG | SYSTOLIC BLOOD PRESSURE: 125 MMHG

## 2023-04-12 RX ADMIN — APIXABAN SCH MG: 2.5 TABLET, FILM COATED ORAL at 08:16

## 2023-04-12 RX ADMIN — APIXABAN SCH MG: 2.5 TABLET, FILM COATED ORAL at 17:33

## 2023-04-12 RX ADMIN — SERTRALINE HYDROCHLORIDE SCH MG: 100 TABLET ORAL at 08:16

## 2023-04-12 RX ADMIN — Medication SCH MG: at 20:48

## 2023-04-13 VITALS — SYSTOLIC BLOOD PRESSURE: 134 MMHG | DIASTOLIC BLOOD PRESSURE: 82 MMHG

## 2023-04-13 VITALS — DIASTOLIC BLOOD PRESSURE: 84 MMHG | SYSTOLIC BLOOD PRESSURE: 141 MMHG

## 2023-04-13 VITALS — SYSTOLIC BLOOD PRESSURE: 131 MMHG | DIASTOLIC BLOOD PRESSURE: 82 MMHG

## 2023-04-13 LAB — SARS-COV-2 AG RESP QL IA.RAPID: (no result)

## 2023-04-13 RX ADMIN — SERTRALINE HYDROCHLORIDE SCH MG: 100 TABLET ORAL at 08:25

## 2023-04-13 RX ADMIN — Medication SCH MG: at 20:43

## 2023-04-13 RX ADMIN — APIXABAN SCH MG: 2.5 TABLET, FILM COATED ORAL at 08:25

## 2023-04-13 RX ADMIN — APIXABAN SCH MG: 2.5 TABLET, FILM COATED ORAL at 18:15

## 2023-04-14 VITALS — SYSTOLIC BLOOD PRESSURE: 132 MMHG | DIASTOLIC BLOOD PRESSURE: 82 MMHG

## 2023-04-14 RX ADMIN — SERTRALINE HYDROCHLORIDE SCH MG: 100 TABLET ORAL at 08:12

## 2023-04-14 RX ADMIN — APIXABAN SCH MG: 2.5 TABLET, FILM COATED ORAL at 08:12

## 2023-07-03 ENCOUNTER — HOSPITAL ENCOUNTER (INPATIENT)
Dept: HOSPITAL 27 - EMS | Age: 63
LOS: 7 days | Discharge: HOME | DRG: 750 | End: 2023-07-10
Attending: PSYCHIATRIC HOSPITAL | Admitting: PSYCHIATRIC HOSPITAL
Payer: MEDICAID

## 2023-07-03 VITALS — RESPIRATION RATE: 17 BRPM

## 2023-07-03 VITALS
RESPIRATION RATE: 18 BRPM | SYSTOLIC BLOOD PRESSURE: 129 MMHG | DIASTOLIC BLOOD PRESSURE: 78 MMHG | HEART RATE: 105 BPM | TEMPERATURE: 97.3 F | OXYGEN SATURATION: 97 %

## 2023-07-03 VITALS — BODY MASS INDEX: 47.74 KG/M2 | HEIGHT: 68 IN | WEIGHT: 315 LBS

## 2023-07-03 DIAGNOSIS — Z59.00: ICD-10-CM

## 2023-07-03 DIAGNOSIS — R45.851: ICD-10-CM

## 2023-07-03 DIAGNOSIS — G47.00: ICD-10-CM

## 2023-07-03 DIAGNOSIS — Z20.822: ICD-10-CM

## 2023-07-03 DIAGNOSIS — Z79.899: ICD-10-CM

## 2023-07-03 DIAGNOSIS — F43.10: ICD-10-CM

## 2023-07-03 DIAGNOSIS — F25.1: Primary | ICD-10-CM

## 2023-07-03 DIAGNOSIS — Z91.013: ICD-10-CM

## 2023-07-03 DIAGNOSIS — R00.0: ICD-10-CM

## 2023-07-03 DIAGNOSIS — K50.90: ICD-10-CM

## 2023-07-03 DIAGNOSIS — J44.9: ICD-10-CM

## 2023-07-03 LAB
ALBUMIN SERPL-MCNC: 3.4 G/DL (ref 3.4–5)
ALP SERPL-CCNC: 118 U/L (ref 46–116)
ALT SERPL-CCNC: 21 U/L (ref 12–78)
AMPHETAMINES UR QL SCN: NEGATIVE
ANION GAP SERPL CALCULATED.3IONS-SCNC: 7 MMOL/L (ref 8–16)
AST SERPL-CCNC: 15 U/L (ref 15–37)
BARBITURATES UR QL SCN: NEGATIVE
BASOPHILS # BLD AUTO: 0.1 K/UL (ref 0–0.2)
BASOPHILS NFR BLD AUTO: 1.1 % (ref 0–2)
BENZODIAZ UR QL SCN: NEGATIVE
BILIRUB SERPL-MCNC: 0.2 MG/DL (ref 0.1–1)
BZE UR QL SCN: NEGATIVE
CALCIUM SERPL-MCNC: 9.1 MG/DL (ref 8.8–10.5)
CANNABINOIDS UR QL SCN: NEGATIVE
CHLORIDE SERPL-SCNC: 104 MMOL/L (ref 98–107)
CO2 SERPL-SCNC: 26 MMOL/L (ref 22–29)
CREAT SERPL-MCNC: 1.04 MG/DL (ref 0.6–1.3)
EOSINOPHIL # BLD AUTO: 0.1 K/UL (ref 0–0.7)
EOSINOPHIL NFR BLD AUTO: 0.9 % (ref 1–6)
ERYTHROCYTE [DISTWIDTH] IN BLOOD BY AUTOMATED COUNT: 14.4 % (ref 11.5–14.5)
GFR SERPL CREATININE-BSD FRML MDRD: > 60 ML/MIN (ref 60–?)
GLUCOSE SERPL-MCNC: 109 MG/DL (ref 70–110)
HCT VFR BLD AUTO: 43.7 % (ref 41–53)
HGB BLD-MCNC: 14.8 G/DL (ref 13.5–17.5)
LYMPHOCYTES # BLD AUTO: 1.7 K/UL (ref 1–4.8)
LYMPHOCYTES NFR BLD AUTO: 19.8 % (ref 22–44)
MCH RBC QN AUTO: 32.1 PG (ref 26–34)
MCHC RBC AUTO-ENTMCNC: 33.9 G/DL (ref 31–37)
MCV RBC AUTO: 95 FL (ref 80–100)
METHADONE UR QL SCN: NEGATIVE
MONOCYTES # BLD AUTO: 0.7 K/UL (ref 0.1–1)
MONOCYTES NFR BLD AUTO: 7.7 % (ref 2–9)
NEUTROPHILS # BLD AUTO: 6.2 K/UL (ref 1.8–7.7)
NEUTROPHILS NFR BLD AUTO: 70.5 % (ref 40–70)
OPIATES UR QL SCN: NEGATIVE
PCP UR QL SCN: NEGATIVE
PLATELET # BLD AUTO: 262 K/UL (ref 150–450)
POTASSIUM SERPL-SCNC: 3.6 MMOL/L (ref 3.5–5.1)
PROT SERPL-MCNC: 6.9 G/DL (ref 6.4–8.2)
RBC # BLD AUTO: 4.61 MIL/UL (ref 4.5–5.9)
SARS-COV-2 AG RESP QL IA.RAPID: (no result)
SODIUM SERPL-SCNC: 137 MMOL/L (ref 136–145)

## 2023-07-03 PROCEDURE — 99285 EMERGENCY DEPT VISIT HI MDM: CPT

## 2023-07-03 PROCEDURE — 87081 CULTURE SCREEN ONLY: CPT

## 2023-07-03 PROCEDURE — 85025 COMPLETE CBC W/AUTO DIFF WBC: CPT

## 2023-07-03 PROCEDURE — 80061 LIPID PANEL: CPT

## 2023-07-03 PROCEDURE — 80053 COMPREHEN METABOLIC PANEL: CPT

## 2023-07-03 PROCEDURE — 83036 HEMOGLOBIN GLYCOSYLATED A1C: CPT

## 2023-07-03 PROCEDURE — 80307 DRUG TEST PRSMV CHEM ANLYZR: CPT

## 2023-07-03 SDOH — ECONOMIC STABILITY - HOUSING INSECURITY: HOMELESSNESS UNSPECIFIED: Z59.00

## 2023-07-04 VITALS
RESPIRATION RATE: 18 BRPM | HEART RATE: 66 BPM | TEMPERATURE: 98 F | OXYGEN SATURATION: 96 % | DIASTOLIC BLOOD PRESSURE: 64 MMHG | SYSTOLIC BLOOD PRESSURE: 120 MMHG

## 2023-07-04 VITALS
TEMPERATURE: 97.6 F | RESPIRATION RATE: 18 BRPM | DIASTOLIC BLOOD PRESSURE: 60 MMHG | HEART RATE: 65 BPM | SYSTOLIC BLOOD PRESSURE: 105 MMHG | OXYGEN SATURATION: 99 %

## 2023-07-04 RX ADMIN — PRAZOSIN HYDROCHLORIDE SCH MG: 2 CAPSULE ORAL at 20:48

## 2023-07-04 RX ADMIN — Medication SCH MG: at 20:48

## 2023-07-04 RX ADMIN — SERTRALINE HYDROCHLORIDE SCH MG: 100 TABLET ORAL at 13:05

## 2023-07-05 VITALS
OXYGEN SATURATION: 98 % | SYSTOLIC BLOOD PRESSURE: 102 MMHG | DIASTOLIC BLOOD PRESSURE: 68 MMHG | RESPIRATION RATE: 18 BRPM | HEART RATE: 72 BPM | TEMPERATURE: 97.5 F

## 2023-07-05 VITALS — RESPIRATION RATE: 18 BRPM

## 2023-07-05 RX ADMIN — SERTRALINE HYDROCHLORIDE SCH MG: 100 TABLET ORAL at 09:38

## 2023-07-05 RX ADMIN — PRAZOSIN HYDROCHLORIDE SCH MG: 2 CAPSULE ORAL at 20:28

## 2023-07-05 RX ADMIN — Medication SCH MG: at 20:29

## 2023-07-06 VITALS
RESPIRATION RATE: 17 BRPM | OXYGEN SATURATION: 98 % | DIASTOLIC BLOOD PRESSURE: 59 MMHG | SYSTOLIC BLOOD PRESSURE: 101 MMHG | HEART RATE: 66 BPM | TEMPERATURE: 97.8 F

## 2023-07-06 VITALS
HEART RATE: 65 BPM | SYSTOLIC BLOOD PRESSURE: 103 MMHG | TEMPERATURE: 98.1 F | RESPIRATION RATE: 17 BRPM | OXYGEN SATURATION: 95 % | DIASTOLIC BLOOD PRESSURE: 45 MMHG

## 2023-07-06 VITALS
OXYGEN SATURATION: 98 % | HEART RATE: 66 BPM | SYSTOLIC BLOOD PRESSURE: 101 MMHG | TEMPERATURE: 97.8 F | RESPIRATION RATE: 18 BRPM | DIASTOLIC BLOOD PRESSURE: 59 MMHG

## 2023-07-06 RX ADMIN — ZOLPIDEM TARTRATE PRN MG: 10 TABLET ORAL at 20:50

## 2023-07-06 RX ADMIN — Medication SCH MG: at 20:29

## 2023-07-06 RX ADMIN — SERTRALINE HYDROCHLORIDE SCH MG: 100 TABLET ORAL at 08:16

## 2023-07-06 RX ADMIN — PRAZOSIN HYDROCHLORIDE SCH MG: 2 CAPSULE ORAL at 20:29

## 2023-07-07 VITALS
HEART RATE: 68 BPM | RESPIRATION RATE: 17 BRPM | SYSTOLIC BLOOD PRESSURE: 114 MMHG | TEMPERATURE: 98.3 F | DIASTOLIC BLOOD PRESSURE: 66 MMHG | OXYGEN SATURATION: 95 %

## 2023-07-07 VITALS — TEMPERATURE: 97.6 F | OXYGEN SATURATION: 99 % | HEART RATE: 61 BPM | RESPIRATION RATE: 18 BRPM

## 2023-07-07 RX ADMIN — Medication SCH MG: at 20:07

## 2023-07-07 RX ADMIN — PRAZOSIN HYDROCHLORIDE SCH MG: 2 CAPSULE ORAL at 20:08

## 2023-07-07 RX ADMIN — SERTRALINE HYDROCHLORIDE SCH MG: 100 TABLET ORAL at 08:49

## 2023-07-08 VITALS
OXYGEN SATURATION: 96 % | SYSTOLIC BLOOD PRESSURE: 119 MMHG | HEART RATE: 61 BPM | RESPIRATION RATE: 18 BRPM | DIASTOLIC BLOOD PRESSURE: 62 MMHG | TEMPERATURE: 97.8 F

## 2023-07-08 VITALS
SYSTOLIC BLOOD PRESSURE: 98 MMHG | DIASTOLIC BLOOD PRESSURE: 55 MMHG | TEMPERATURE: 96.8 F | RESPIRATION RATE: 16 BRPM | HEART RATE: 60 BPM | OXYGEN SATURATION: 97 %

## 2023-07-08 LAB
CHOLEST SERPL-MCNC: 167 MG/DL (ref 131–200)
CHOLEST/HDLC SERPL: 2.3 {RATIO} (ref 4.2–7.3)
HDLC SERPL-MCNC: 73 MG/DL (ref 40–60)
LDLC SERPL CALC-MCNC: 75 MG/DL (ref 0–130)
TRIGL SERPL-MCNC: 95 MG/DL (ref 15–150)

## 2023-07-08 RX ADMIN — PRAZOSIN HYDROCHLORIDE SCH MG: 2 CAPSULE ORAL at 20:00

## 2023-07-08 RX ADMIN — SERTRALINE HYDROCHLORIDE SCH MG: 100 TABLET ORAL at 08:48

## 2023-07-08 RX ADMIN — ZOLPIDEM TARTRATE PRN MG: 10 TABLET ORAL at 20:40

## 2023-07-08 RX ADMIN — Medication SCH MG: at 20:00

## 2023-07-09 VITALS
OXYGEN SATURATION: 81 % | HEART RATE: 81 BPM | DIASTOLIC BLOOD PRESSURE: 94 MMHG | TEMPERATURE: 97.2 F | RESPIRATION RATE: 18 BRPM | SYSTOLIC BLOOD PRESSURE: 157 MMHG

## 2023-07-09 VITALS
OXYGEN SATURATION: 95 % | DIASTOLIC BLOOD PRESSURE: 72 MMHG | RESPIRATION RATE: 19 BRPM | TEMPERATURE: 98.4 F | SYSTOLIC BLOOD PRESSURE: 127 MMHG | HEART RATE: 60 BPM

## 2023-07-09 RX ADMIN — SERTRALINE HYDROCHLORIDE SCH MG: 100 TABLET ORAL at 09:36

## 2023-07-09 RX ADMIN — PRAZOSIN HYDROCHLORIDE SCH MG: 2 CAPSULE ORAL at 20:20

## 2023-07-09 RX ADMIN — Medication SCH MG: at 20:20

## 2023-07-10 VITALS
OXYGEN SATURATION: 95 % | RESPIRATION RATE: 18 BRPM | TEMPERATURE: 96.9 F | HEART RATE: 61 BPM | DIASTOLIC BLOOD PRESSURE: 60 MMHG | SYSTOLIC BLOOD PRESSURE: 83 MMHG

## 2023-07-10 RX ADMIN — SERTRALINE HYDROCHLORIDE SCH MG: 100 TABLET ORAL at 09:00

## 2024-02-27 NOTE — ED ADULT NURSE NOTE - NS ED NURSE DISCH DISPOSITION
PRINCIPAL DISCHARGE DIAGNOSIS  Diagnosis: Coronary artery disease with exertional angina  Assessment and Plan of Treatment:       SECONDARY DISCHARGE DIAGNOSES  Diagnosis: Thrombocytopenia  Assessment and Plan of Treatment:      PRINCIPAL DISCHARGE DIAGNOSIS  Diagnosis: Coronary artery disease with exertional angina  Assessment and Plan of Treatment: Non ST elevated MI  - s/p Cath with CAD not ammenable to PCI  - Start Imdur 30mg PO daily  - Atorvastatin increased to 40mg PO daily  Follow up with cardiology      SECONDARY DISCHARGE DIAGNOSES  Diagnosis: Thrombocytopenia  Assessment and Plan of Treatment:      PRINCIPAL DISCHARGE DIAGNOSIS  Diagnosis: Coronary artery disease with exertional angina  Assessment and Plan of Treatment: Non ST elevated MI  - s/p Cath with CAD not ammenable to PCI  - Start Imdur 30mg PO daily  - Atorvastatin increased to 40mg PO daily  Follow up with cardiology      SECONDARY DISCHARGE DIAGNOSES  Diagnosis: Thrombocytopenia  Assessment and Plan of Treatment: with history of anemia, thrombocytopenia, admitted with chest pain. Previous workup had been negative for nutritional deficits, hemolysis, malignancy. Bone marrow biopsy in 2021 was unremarkable. Follow up with Dr Kayla Wei       PRINCIPAL DISCHARGE DIAGNOSIS  Diagnosis: Coronary artery disease with exertional angina  Assessment and Plan of Treatment: Non ST elevated MI  - s/p Cath with CAD not ammenable to PCI  - Start Imdur 60mg PO daily  - Atorvastatin increased to 40mg PO daily  Follow up with cardiology      SECONDARY DISCHARGE DIAGNOSES  Diagnosis: Thrombocytopenia  Assessment and Plan of Treatment: with history of anemia, thrombocytopenia, admitted with chest pain. Previous workup had been negative for nutritional deficits, hemolysis, malignancy. Bone marrow biopsy in 2021 was unremarkable. Follow up with Dr Kayla Wei       Eloped (saw a physician/midlevel provider but left without telling anyone)